# Patient Record
Sex: MALE | Race: WHITE | NOT HISPANIC OR LATINO | Employment: FULL TIME | URBAN - NONMETROPOLITAN AREA
[De-identification: names, ages, dates, MRNs, and addresses within clinical notes are randomized per-mention and may not be internally consistent; named-entity substitution may affect disease eponyms.]

---

## 2017-02-26 ENCOUNTER — HISTORY (OUTPATIENT)
Dept: EMERGENCY MEDICINE | Facility: OTHER | Age: 37
End: 2017-02-26

## 2017-08-31 ENCOUNTER — HISTORY (OUTPATIENT)
Dept: EMERGENCY MEDICINE | Facility: OTHER | Age: 37
End: 2017-08-31

## 2018-05-21 ENCOUNTER — OFFICE VISIT (OUTPATIENT)
Dept: FAMILY MEDICINE | Facility: OTHER | Age: 38
End: 2018-05-21
Attending: NURSE PRACTITIONER
Payer: COMMERCIAL

## 2018-05-21 VITALS
BODY MASS INDEX: 33.18 KG/M2 | SYSTOLIC BLOOD PRESSURE: 140 MMHG | TEMPERATURE: 98 F | WEIGHT: 245 LBS | HEART RATE: 72 BPM | DIASTOLIC BLOOD PRESSURE: 82 MMHG | HEIGHT: 72 IN
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
## 2018-05-21 DIAGNOSIS — Z91.89 AT HIGH RISK FOR TICK BORNE ILLNESS: ICD-10-CM

## 2018-05-21 LAB
BASOPHILS # BLD AUTO: 0 10E9/L (ref 0–0.2)
BASOPHILS NFR BLD AUTO: 0.6 %
DIFFERENTIAL METHOD BLD: ABNORMAL
EOSINOPHIL # BLD AUTO: 0.1 10E9/L (ref 0–0.7)
EOSINOPHIL NFR BLD AUTO: 1.2 %
ERYTHROCYTE [DISTWIDTH] IN BLOOD BY AUTOMATED COUNT: 13 % (ref 10–15)
HCT VFR BLD AUTO: 39.6 % (ref 40–53)
HGB BLD-MCNC: 13.4 G/DL (ref 13.3–17.7)
IMM GRANULOCYTES # BLD: 0 10E9/L (ref 0–0.4)
IMM GRANULOCYTES NFR BLD: 0.2 %
LYMPHOCYTES # BLD AUTO: 1.5 10E9/L (ref 0.8–5.3)
LYMPHOCYTES NFR BLD AUTO: 29.8 %
MCH RBC QN AUTO: 29.2 PG (ref 26.5–33)
MCHC RBC AUTO-ENTMCNC: 33.8 G/DL (ref 31.5–36.5)
MCV RBC AUTO: 86 FL (ref 78–100)
MONOCYTES # BLD AUTO: 0.5 10E9/L (ref 0–1.3)
MONOCYTES NFR BLD AUTO: 9.5 %
NEUTROPHILS # BLD AUTO: 2.9 10E9/L (ref 1.6–8.3)
NEUTROPHILS NFR BLD AUTO: 58.7 %
PLATELET # BLD AUTO: 277 10E9/L (ref 150–450)
RBC # BLD AUTO: 4.59 10E12/L (ref 4.4–5.9)
WBC # BLD AUTO: 4.9 10E9/L (ref 4–11)

## 2018-05-21 PROCEDURE — 85025 COMPLETE CBC W/AUTO DIFF WBC: CPT | Performed by: NURSE PRACTITIONER

## 2018-05-21 PROCEDURE — 99213 OFFICE O/P EST LOW 20 MIN: CPT | Performed by: NURSE PRACTITIONER

## 2018-05-21 PROCEDURE — 86666 EHRLICHIA ANTIBODY: CPT | Performed by: NURSE PRACTITIONER

## 2018-05-21 PROCEDURE — 36415 COLL VENOUS BLD VENIPUNCTURE: CPT | Performed by: NURSE PRACTITIONER

## 2018-05-21 RX ORDER — CLOBETASOL PROPIONATE 0.5 MG/G
OINTMENT TOPICAL
COMMUNITY
Start: 2017-04-27 | End: 2018-11-08

## 2018-05-21 RX ORDER — SERTRALINE HYDROCHLORIDE 100 MG/1
150 TABLET, FILM COATED ORAL
COMMUNITY
Start: 2016-10-26 | End: 2018-11-08

## 2018-05-21 NOTE — NURSING NOTE
Patient presents to the clinic for concerns regarding lyme disease. Patient reports picking off dozens of ticks this spring and states he feels achy x 4 days.  Geovanna MILLER CMA.......5/21/2018..10:11 AM

## 2018-05-21 NOTE — PROGRESS NOTES
Nursing Notes:   Geovanna Stacy CMA  5/21/2018 10:12 AM  Unsigned  Patient presents to the clinic for concerns regarding lyme disease. Patient reports picking off dozens of ticks this spring and states he feels achy x 4 days.  Geovanna MILLER CMA.......5/21/2018..10:11 AM      SUBJECTIVE:   Sb Olivo is a 37 year old male who presents to clinic today for the following health issues:    Lyme's Concern      Duration: Patient comes in with concerns that he might have Lyme's disease.  First bit by a deer tick 2 weeks ago.  Since has had several ticks after.  Denies fevers, chills, rashes.  States he has body aches, headaches and fatigue.    Description (location/character/radiation): None    Intensity:  mild    Accompanying signs and symptoms: See above    History (similar episodes/previous evaluation): None    Precipitating or alleviating factors: Several tick bites a few deer ticks    Therapies tried and outcome: None       Problem list and histories reviewed & adjusted, as indicated.  Additional history: as documented    Current Outpatient Prescriptions   Medication Sig Dispense Refill     clobetasol (TEMOVATE) 0.05 % ointment        sertraline (ZOLOFT) 100 MG tablet Take 150 mg by mouth       Allergies   Allergen Reactions     Amoxicillin Hives     Penicillins      Other reaction(s): *Unknown       Reviewed and updated as needed this visit by clinical staff  Tobacco  Allergies  Meds  Med Hx  Surg Hx  Fam Hx  Soc Hx      Reviewed and updated as needed this visit by Provider         ROS:  A comprehensive 10 point ROS was obtained and documented for notable findings in the HPI.       OBJECTIVE:     /82 (BP Location: Right arm, Patient Position: Sitting, Cuff Size: Adult Large)  Pulse 72  Temp 98  F (36.7  C) (Tympanic)  Ht 6' (1.829 m)  Wt 245 lb (111.1 kg)  BMI 33.23 kg/m2  Body mass index is 33.23 kg/(m^2).  GENERAL: healthy, alert and no distress  EYES: Eyes grossly normal to inspection  RESP: With  ease, non labored  SKIN: no suspicious lesions or rashes  PSYCH: mentation appears normal, affect normal/bright    Diagnostic Test Results:  Results for orders placed or performed in visit on 05/21/18 (from the past 24 hour(s))   CBC and Differential   Result Value Ref Range    WBC 4.9 4.0 - 11.0 10e9/L    RBC Count 4.59 4.4 - 5.9 10e12/L    Hemoglobin 13.4 13.3 - 17.7 g/dL    Hematocrit 39.6 (L) 40.0 - 53.0 %    MCV 86 78 - 100 fl    MCH 29.2 26.5 - 33.0 pg    MCHC 33.8 31.5 - 36.5 g/dL    RDW 13.0 10.0 - 15.0 %    Platelet Count 277 150 - 450 10e9/L    Diff Method Automated Method     % Neutrophils 58.7 %    % Lymphocytes 29.8 %    % Monocytes 9.5 %    % Eosinophils 1.2 %    % Basophils 0.6 %    % Immature Granulocytes 0.2 %    Absolute Neutrophil 2.9 1.6 - 8.3 10e9/L    Absolute Lymphocytes 1.5 0.8 - 5.3 10e9/L    Absolute Monocytes 0.5 0.0 - 1.3 10e9/L    Absolute Eosinophils 0.1 0.0 - 0.7 10e9/L    Absolute Basophils 0.0 0.0 - 0.2 10e9/L    Abs Immature Granulocytes 0.0 0 - 0.4 10e9/L       ASSESSMENT/PLAN:     1. Tick bite, initial encounter  - CBC and Differential  - HCL LYME IGG AND IGM SCREEN  - Anaplasma phagocytoph antibody IgG IgM    2. At high risk for tick borne illness  - CBC and Differential  - HCL LYME IGG AND IGM SCREEN  - Anaplasma phagocytoph antibody IgG IgM    Medical Decision Making:    Differential Diagnosis:  Fatigue, Stress, depression, Tick born illness.     Serious Comorbid Conditions:  Adult:  None    PLAN:    Rest, fluids, will call with lab results.     Followup:    If not improving or if condition worsens, follow up with your Primary Care Provider        Deirdre Nelson NP, 5/21/2018 10:16 AM

## 2018-05-21 NOTE — MR AVS SNAPSHOT
"              After Visit Summary   5/21/2018    Sb Olivo    MRN: [id number]           Patient Information     Date Of Birth          1980        Visit Information        Provider Department      5/21/2018 10:00 AM Deirdre Nelson NP [facility]        Today's Diagnoses     Tick bite, initial encounter    -  1    At high risk for tick borne illness          Care Instructions    Thank you for choosing [facility] for your care.     You are advised to contact our office if there is no improvement or if there is worsening of conditions or symptoms, either come back or follow up with your primary care provider.     You were seen in the [facility]. This is for urgent care needs. If you have other questions or concerns please see your primary care provider.         Deirdre Nelson RN, MSN, FNP  [facility]               Follow-ups after your visit        Who to contact     If you have questions or need follow up information about today's clinic visit or your schedule please contact [facility] directly at [phone].  Normal or non-critical lab and imaging results will be communicated to you by Twillionhart, letter or phone within 4 business days after the clinic has received the results. If you do not hear from us within 7 days, please contact the clinic through pSiFlow Technologyt or phone. If you have a critical or abnormal lab result, we will notify you by phone as soon as possible.  Submit refill requests through Aspire or call your pharmacy and they will forward the refill request to us. Please allow 3 business days for your refill to be completed.          Additional Information About Your Visit        Tw[employer] Information     Aspire lets you send messages to your doctor, view your test results, renew your prescriptions, schedule appointments and more. To sign up, go to [email or web address] . Click on \"Log in\" on " "the left side of the screen, which will take you to the Welcome page. Then click on \"Sign up Now\" on the right side of the page.     You will be asked to enter the access code listed below, as well as some personal information. Please follow the directions to create your username and password.     Your access code is: [email or web address]  Expires: 2018 10:41 AM     Your access code will  in 90 days. If you need help or a new code, please call your [facility] or [phone].        Care EveryWhere ID     This is your Care EveryWhere ID. This could be used by other organizations to access your [place] medical records  [id number]        Your Vitals Were     Pulse Temperature Height BMI (Body Mass Index)          72 98  F (36.7  C) (Tympanic) 6' (1.829 m) 33.23 kg/m2         Blood Pressure from Last 3 Encounters:   18 140/82    Weight from Last 3 Encounters:   18 245 lb (111.1 kg)              We Performed the Following     Anaplasma phagocytoph antibody IgG IgM     CBC and Differential     HCL LYME IGG AND IGM SCREEN        Primary Care Provider Fax #    Physician No Ref-Primary [phone]       No address on file        Equal Access to Services     [facility] : Hadyanely garciao Soletty, waaxda luqadaha, qaybta kaalmada adeegyada, kassy coronado . So [facility] [phone].    ATENCIÓN: Si habla español, tiene a vincent disposición servicios gratuitos de asistencia lingüística. Llame al [phone].    We comply with applicable federal civil rights laws and [place] laws. We do not discriminate on the basis of race, color, national origin, age, disability, sex, sexual orientation, or gender identity.            Thank you!     Thank you for choosing [facility] [place]  for your care. Our goal is always to provide you with excellent care. Hearing back from our patients is one way we can continue to improve our services. Please take a few minutes to complete " the written survey that you may receive in the mail after your visit with us. Thank you!             Your Updated Medication List - Protect others around you: Learn how to safely use, store and throw away your medicines at www.disposemymeds.org.          This list is accurate as of 5/21/18 10:41 AM.  Always use your most recent med list.                   Brand Name Dispense Instructions for use Diagnosis    clobetasol 0.05 % ointment    TEMOVATE          sertraline 100 MG tablet    ZOLOFT     Take 150 mg by mouth

## 2018-05-21 NOTE — PATIENT INSTRUCTIONS
Thank you for choosing [facility] for your care.     You are advised to contact our office if there is no improvement or if there is worsening of conditions or symptoms, either come back or follow up with your primary care provider.     You were seen in the [facility]. This is for urgent care needs. If you have other questions or concerns please see your primary care provider.         Deirdre Nelson RN, MSN, FNP  [facility]

## 2018-05-23 LAB
A PHAGOCYTOPH IGG TITR SER IF: NORMAL {TITER}
A PHAGOCYTOPH IGM TITR SER IF: NORMAL {TITER}

## 2018-11-08 ENCOUNTER — OFFICE VISIT (OUTPATIENT)
Dept: FAMILY MEDICINE | Facility: OTHER | Age: 38
End: 2018-11-08
Attending: PHYSICIAN ASSISTANT
Payer: COMMERCIAL

## 2018-11-08 VITALS
TEMPERATURE: 98.8 F | OXYGEN SATURATION: 95 % | DIASTOLIC BLOOD PRESSURE: 82 MMHG | BODY MASS INDEX: 35.21 KG/M2 | HEIGHT: 72 IN | SYSTOLIC BLOOD PRESSURE: 132 MMHG | WEIGHT: 260 LBS | HEART RATE: 81 BPM

## 2018-11-08 DIAGNOSIS — J02.9 SORE THROAT: Primary | ICD-10-CM

## 2018-11-08 LAB
DEPRECATED S PYO AG THROAT QL EIA: NORMAL
SPECIMEN SOURCE: NORMAL

## 2018-11-08 PROCEDURE — 87880 STREP A ASSAY W/OPTIC: CPT | Performed by: NURSE PRACTITIONER

## 2018-11-08 PROCEDURE — 99213 OFFICE O/P EST LOW 20 MIN: CPT | Performed by: NURSE PRACTITIONER

## 2018-11-08 RX ORDER — VENLAFAXINE HYDROCHLORIDE 150 MG/1
75 CAPSULE, EXTENDED RELEASE ORAL DAILY
COMMUNITY
Start: 2018-07-26 | End: 2020-03-03

## 2018-11-08 ASSESSMENT — ENCOUNTER SYMPTOMS: SORE THROAT: 1

## 2018-11-08 ASSESSMENT — PAIN SCALES - GENERAL: PAINLEVEL: MILD PAIN (2)

## 2018-11-08 NOTE — NURSING NOTE
Patient presents to the clinic today with complaints of waking up with a sore throat this morning.  Sherin Prater LPN 11/8/2018   1:51 PM

## 2018-11-08 NOTE — PROGRESS NOTES
SUBJECTIVE:   Sb Olivo is a 37 year old male who presents to clinic today for the following health issues:    Presents with 2 children for strep test, have had sore throats--nasal congestion, no fever  Taking fluids well  No other symptoms    HPI    There is no problem list on file for this patient.    History reviewed. No pertinent surgical history.    Social History   Substance Use Topics     Smoking status: Never Smoker     Smokeless tobacco: Current User     Types: Chew     Alcohol use Yes      Comment: Alcoholic Drinks/day: socially     History reviewed. No pertinent family history.      Current Outpatient Prescriptions   Medication Sig Dispense Refill     metFORMIN (GLUCOPHAGE) 500 MG tablet Take 500 mg by mouth 2 times daily (with meals)  2     venlafaxine (EFFEXOR-XR) 150 MG 24 hr capsule Take 150 mg by mouth daily       Allergies   Allergen Reactions     Amoxicillin Hives     Penicillins      Other reaction(s): *Unknown     Recent Labs   Lab Test  08/31/17   1819   CR  1.04   GFRESTBLACK  >60   POTASSIUM  3.8      BP Readings from Last 3 Encounters:   11/08/18 132/82   05/21/18 140/82    Wt Readings from Last 3 Encounters:   11/08/18 260 lb (117.9 kg)   05/21/18 245 lb (111.1 kg)                  Labs reviewed in EPIC    Review of Systems   HENT: Positive for sore throat.    All other systems reviewed and are negative.       OBJECTIVE:     /82 (BP Location: Right arm, Patient Position: Sitting, Cuff Size: Adult Regular)  Pulse 81  Temp 98.8  F (37.1  C) (Tympanic)  Ht 6' (1.829 m)  Wt 260 lb (117.9 kg)  SpO2 95%  BMI 35.26 kg/m2  Body mass index is 35.26 kg/(m^2).  Physical Exam   Constitutional: He is oriented to person, place, and time. He appears well-developed and well-nourished.   HENT:   Head: Normocephalic and atraumatic.   Posterior pharynx mildly injected, no tonsillar hypertrophy or pustules, uvula midline without edema   Neck: Normal range of motion. Neck supple.    Cardiovascular: Normal rate.    Pulmonary/Chest: Effort normal.   Musculoskeletal: Normal range of motion.   Lymphadenopathy:     He has no cervical adenopathy.   Neurological: He is alert and oriented to person, place, and time.   Skin: Skin is warm and dry.   Psychiatric: He has a normal mood and affect. His behavior is normal. Judgment and thought content normal.   Nursing note and vitals reviewed.      Results for orders placed or performed in visit on 11/08/18   Strep, Rapid Screen   Result Value Ref Range    Specimen Description Throat     Rapid Strep A Screen       Negative presumptive for Group A Beta Streptococcus       ASSESSMENT/PLAN:   Strep negative likely viral  Recommend pushing fluids, ibuprofen, supportive therapies  Return to clinic if no improvement    1. Sore throat    - Strep, Rapid Screen      ALEX León CNP  [facility] [place]

## 2018-11-08 NOTE — PATIENT INSTRUCTIONS
Self-Care for Sore Throats    Sore throats happen for many reasons, such as colds, allergies, and infections caused by viruses or bacteria. In any case, your throat becomes red and sore. Your goal for self-care is to reduce your discomfort while giving your throat a chance to heal.  Moisten and soothe your throat  Tips include the following:    Try a sip of water first thing after waking up.    Keep your throat moist by drinking 6 or more glasses of clear liquids every day.    Run a cool-air humidifier in your room overnight.    Avoid cigarette smoke.     Suck on throat lozenges, cough drops, hard candy, ice chips, or frozen fruit-juice bars. Use the sugar-free versions if your diet or medical condition requires them.  Gargle to ease irritation  Gargling every hour or 2 can ease irritation. Try gargling with 1 of these solutions:    1/4 teaspoon of salt in 1/2 cup of warm water    An over-the-counter anesthetic gargle  Use medicine for more relief  Over-the-counter medicine can reduce sore throat symptoms. Ask your pharmacist if you have questions about which medicine to use:    Ease pain with anesthetic sprays. Aspirin or an aspirin substitute also helps. Remember, never give aspirin to anyone 18 or younger, or if you are already taking blood thinners.     For sore throats caused by allergies, try antihistamines to block the allergic reaction.    Remember: unless a sore throat is caused by a bacterial infection, antibiotics won t help you.  Prevent future sore throats  Prevention tips include the following:    Stop smoking or reduce contact with secondhand smoke. Smoke irritates the tender throat lining.    Limit contact with pets and with allergy-causing substances, such as pollen and mold.    When you re around someone with a sore throat or cold, wash your hands often to keep viruses or bacteria from spreading.    Don t strain your vocal cords.  Call your healthcare provider  Contact your healthcare provider if  you have:    A temperature over 101 F (38.3 C)    White spots on the throat    Great difficulty swallowing    Trouble breathing    A skin rash    Recent exposure to someone else with strep bacteria    Severe hoarseness and swollen glands in the neck or jaw   Date Last Reviewed: 8/1/2016 2000-2018 [employer]. [address] [place] [address]. All rights reserved. This information is not intended as a substitute for professional medical care. Always follow your healthcare professional's instructions.

## 2018-11-08 NOTE — MR AVS SNAPSHOT
After Visit Summary   11/8/2018    Sb Olivo    MRN: [id number]           Patient Information     Date Of Birth          1980        Visit Information        Provider Department      11/8/2018 1:15 PM Allyson Frgaa APRN CNP [facility]        Today's Diagnoses     Sore throat    -  1      Care Instructions      Self-Care for Sore Throats    Sore throats happen for many reasons, such as colds, allergies, and infections caused by viruses or bacteria. In any case, your throat becomes red and sore. Your goal for self-care is to reduce your discomfort while giving your throat a chance to heal.  Moisten and soothe your throat  Tips include the following:    Try a sip of water first thing after waking up.    Keep your throat moist by drinking 6 or more glasses of clear liquids every day.    Run a cool-air humidifier in your room overnight.    Avoid cigarette smoke.     Suck on throat lozenges, cough drops, hard candy, ice chips, or frozen fruit-juice bars. Use the sugar-free versions if your diet or medical condition requires them.  Gargle to ease irritation  Gargling every hour or 2 can ease irritation. Try gargling with 1 of these solutions:    1/4 teaspoon of salt in 1/2 cup of warm water    An over-the-counter anesthetic gargle  Use medicine for more relief  Over-the-counter medicine can reduce sore throat symptoms. Ask your pharmacist if you have questions about which medicine to use:    Ease pain with anesthetic sprays. Aspirin or an aspirin substitute also helps. Remember, never give aspirin to anyone 18 or younger, or if you are already taking blood thinners.     For sore throats caused by allergies, try antihistamines to block the allergic reaction.    Remember: unless a sore throat is caused by a bacterial infection, antibiotics won t help you.  Prevent future sore throats  Prevention tips include the following:    Stop smoking or reduce contact with secondhand  smoke. Smoke irritates the tender throat lining.    Limit contact with pets and with allergy-causing substances, such as pollen and mold.    When you re around someone with a sore throat or cold, wash your hands often to keep viruses or bacteria from spreading.    Don t strain your vocal cords.  Call your healthcare provider  Contact your healthcare provider if you have:    A temperature over 101 F (38.3 C)    White spots on the throat    Great difficulty swallowing    Trouble breathing    A skin rash    Recent exposure to someone else with strep bacteria    Severe hoarseness and swollen glands in the neck or jaw   Date Last Reviewed: 8/1/2016 2000-2018 [employer]. [address] [place] [address]. All rights reserved. This information is not intended as a substitute for professional medical care. Always follow your healthcare professional's instructions.                Follow-ups after your visit        Follow-up notes from your care team     Return if symptoms worsen or fail to improve.      Who to contact     If you have questions or need follow up information about today's clinic visit or your schedule please contact [facility] directly at [phone].  Normal or non-critical lab and imaging results will be communicated to you by Biotie Therapieshart, letter or phone within 4 business days after the clinic has received the results. If you do not hear from us within 7 days, please contact the clinic through BrainCellst or phone. If you have a critical or abnormal lab result, we will notify you by phone as soon as possible.  Submit refill requests through Ooploo or call your pharmacy and they will forward the refill request to us. Please allow 3 business days for your refill to be completed.          Additional Information About Your Visit        Biotie The[employer]ies[employer]Share Information     Ooploo lets you send messages to your doctor, view your test results, renew your prescriptions, schedule  "appointments and more. To sign up, go to [email or web address] . Click on \"Log in\" on the left side of the screen, which will take you to the Welcome page. Then click on \"Sign up Now\" on the right side of the page.     You will be asked to enter the access code listed below, as well as some personal information. Please follow the directions to create your username and password.     Your access code is: [email or web address]  Expires: 2019  2:33 PM     Your access code will  in 90 days. If you need help or a new code, please call your [place] clinic or [phone].        Care EveryWhere ID     This is your Care EveryWhere ID. This could be used by other organizations to access your [place] medical records  [id number]        Your Vitals Were     Pulse Temperature Height Pulse Oximetry BMI (Body Mass Index)       81 98.8  F (37.1  C) (Tympanic) 6' (1.829 m) 95% 35.26 kg/m2        Blood Pressure from Last 3 Encounters:   18 132/82   18 140/82    Weight from Last 3 Encounters:   18 260 lb (117.9 kg)   18 245 lb (111.1 kg)              We Performed the Following     Strep, Rapid Screen        Primary Care Provider Fax #    Physician No Ref-Primary [phone]       No address on file        Equal Access to Services     [facility]: Hadii nayan Santos, waaxda luqadaha, qaybta kaalmada denisa, kassy coronado . So [facility] [phone].    ATENCIÓN: Si habla español, tiene a vincent disposición servicios gratuitos de asistencia lingüística. Llame al [phone].    We comply with applicable federal civil rights laws and [place] laws. We do not discriminate on the basis of race, color, national origin, age, disability, sex, sexual orientation, or gender identity.            Thank you!     Thank you for choosing [facility] [place]  for your care. Our goal is always to provide you with excellent care. Hearing back from our patients is one way we " can continue to improve our services. Please take a few minutes to complete the written survey that you may receive in the mail after your visit with us. Thank you!             Your Updated Medication List - Protect others around you: Learn how to safely use, store and throw away your medicines at www.disposemymeds.org.          This list is accurate as of 11/8/18  2:33 PM.  Always use your most recent med list.                   Brand Name Dispense Instructions for use Diagnosis    metFORMIN 500 MG tablet    GLUCOPHAGE     Take 500 mg by mouth 2 times daily (with meals)        venlafaxine 150 MG 24 hr capsule    EFFEXOR-XR     Take 150 mg by mouth daily

## 2019-07-01 ENCOUNTER — HOSPITAL ENCOUNTER (EMERGENCY)
Facility: OTHER | Age: 39
Discharge: HOME | End: 2019-07-01
Attending: FAMILY MEDICINE | Admitting: FAMILY MEDICINE
Payer: COMMERCIAL

## 2019-07-01 VITALS
BODY MASS INDEX: 35.21 KG/M2 | DIASTOLIC BLOOD PRESSURE: 103 MMHG | HEART RATE: 70 BPM | HEIGHT: 72 IN | SYSTOLIC BLOOD PRESSURE: 176 MMHG | RESPIRATION RATE: 38 BRPM | WEIGHT: 260 LBS | TEMPERATURE: 97.9 F | OXYGEN SATURATION: 94 %

## 2019-07-01 DIAGNOSIS — I10 BENIGN ESSENTIAL HYPERTENSION: ICD-10-CM

## 2019-07-01 LAB
ANION GAP SERPL CALCULATED.3IONS-SCNC: 9 MMOL/L (ref 3–14)
BASOPHILS # BLD AUTO: 0.1 10E9/L (ref 0–0.2)
BASOPHILS NFR BLD AUTO: 0.6 %
BUN SERPL-MCNC: 11 MG/DL (ref 7–25)
CALCIUM SERPL-MCNC: 9.4 MG/DL (ref 8.6–10.3)
CHLORIDE SERPL-SCNC: 101 MMOL/L (ref 98–107)
CO2 SERPL-SCNC: 25 MMOL/L (ref 21–31)
CREAT SERPL-MCNC: 1 MG/DL (ref 0.7–1.3)
DIFFERENTIAL METHOD BLD: NORMAL
EOSINOPHIL # BLD AUTO: 0.2 10E9/L (ref 0–0.7)
EOSINOPHIL NFR BLD AUTO: 2 %
ERYTHROCYTE [DISTWIDTH] IN BLOOD BY AUTOMATED COUNT: 12.8 % (ref 10–15)
GFR SERPL CREATININE-BSD FRML MDRD: 83 ML/MIN/{1.73_M2}
GLUCOSE SERPL-MCNC: 130 MG/DL (ref 70–105)
HCT VFR BLD AUTO: 42.6 % (ref 40–53)
HGB BLD-MCNC: 13.9 G/DL (ref 13.3–17.7)
IMM GRANULOCYTES # BLD: 0 10E9/L (ref 0–0.4)
IMM GRANULOCYTES NFR BLD: 0.4 %
LYMPHOCYTES # BLD AUTO: 2.5 10E9/L (ref 0.8–5.3)
LYMPHOCYTES NFR BLD AUTO: 29.5 %
MCH RBC QN AUTO: 29.3 PG (ref 26.5–33)
MCHC RBC AUTO-ENTMCNC: 32.6 G/DL (ref 31.5–36.5)
MCV RBC AUTO: 90 FL (ref 78–100)
MONOCYTES # BLD AUTO: 0.5 10E9/L (ref 0–1.3)
MONOCYTES NFR BLD AUTO: 6.4 %
NEUTROPHILS # BLD AUTO: 5.2 10E9/L (ref 1.6–8.3)
NEUTROPHILS NFR BLD AUTO: 61.1 %
PLATELET # BLD AUTO: 298 10E9/L (ref 150–450)
POTASSIUM SERPL-SCNC: 3.4 MMOL/L (ref 3.5–5.1)
RBC # BLD AUTO: 4.74 10E12/L (ref 4.4–5.9)
SODIUM SERPL-SCNC: 135 MMOL/L (ref 134–144)
WBC # BLD AUTO: 8.5 10E9/L (ref 4–11)

## 2019-07-01 PROCEDURE — 85025 COMPLETE CBC W/AUTO DIFF WBC: CPT | Performed by: FAMILY MEDICINE

## 2019-07-01 PROCEDURE — 36415 COLL VENOUS BLD VENIPUNCTURE: CPT | Performed by: FAMILY MEDICINE

## 2019-07-01 PROCEDURE — 93005 ELECTROCARDIOGRAM TRACING: CPT | Performed by: FAMILY MEDICINE

## 2019-07-01 PROCEDURE — 99283 EMERGENCY DEPT VISIT LOW MDM: CPT | Mod: Z6 | Performed by: FAMILY MEDICINE

## 2019-07-01 PROCEDURE — 96361 HYDRATE IV INFUSION ADD-ON: CPT | Mod: XU | Performed by: FAMILY MEDICINE

## 2019-07-01 PROCEDURE — 25000131 ZZH RX MED GY IP 250 OP 636 PS 637: Performed by: FAMILY MEDICINE

## 2019-07-01 PROCEDURE — 96374 THER/PROPH/DIAG INJ IV PUSH: CPT | Mod: XU | Performed by: FAMILY MEDICINE

## 2019-07-01 PROCEDURE — 99284 EMERGENCY DEPT VISIT MOD MDM: CPT | Mod: 25 | Performed by: FAMILY MEDICINE

## 2019-07-01 PROCEDURE — 93010 ELECTROCARDIOGRAM REPORT: CPT | Performed by: INTERNAL MEDICINE

## 2019-07-01 PROCEDURE — 80048 BASIC METABOLIC PNL TOTAL CA: CPT | Performed by: FAMILY MEDICINE

## 2019-07-01 PROCEDURE — 25000128 H RX IP 250 OP 636: Performed by: FAMILY MEDICINE

## 2019-07-01 RX ORDER — HYDROCHLOROTHIAZIDE 12.5 MG/1
25 TABLET ORAL DAILY
Qty: 15 TABLET | Refills: 0 | Status: SHIPPED | OUTPATIENT
Start: 2019-07-01 | End: 2019-07-11

## 2019-07-01 RX ORDER — ONDANSETRON 4 MG/1
4 TABLET, ORALLY DISINTEGRATING ORAL
Status: COMPLETED | OUTPATIENT
Start: 2019-07-01 | End: 2019-07-01

## 2019-07-01 RX ORDER — ONDANSETRON 2 MG/ML
4 INJECTION INTRAMUSCULAR; INTRAVENOUS EVERY 30 MIN PRN
Status: DISCONTINUED | OUTPATIENT
Start: 2019-07-01 | End: 2019-07-02 | Stop reason: HOSPADM

## 2019-07-01 RX ORDER — SODIUM CHLORIDE 9 MG/ML
1000 INJECTION, SOLUTION INTRAVENOUS CONTINUOUS
Status: DISCONTINUED | OUTPATIENT
Start: 2019-07-01 | End: 2019-07-02 | Stop reason: HOSPADM

## 2019-07-01 RX ADMIN — SODIUM CHLORIDE 1000 ML: 9 INJECTION, SOLUTION INTRAVENOUS at 21:23

## 2019-07-01 RX ADMIN — ONDANSETRON 4 MG: 4 TABLET, ORALLY DISINTEGRATING ORAL at 20:25

## 2019-07-01 RX ADMIN — ONDANSETRON 4 MG: 2 INJECTION INTRAMUSCULAR; INTRAVENOUS at 21:24

## 2019-07-01 ASSESSMENT — MIFFLIN-ST. JEOR: SCORE: 2127.35

## 2019-07-01 NOTE — ED AVS SNAPSHOT
[facility]  [address]  [place] [address]  Phone:  [phone]  Fax:  [phone]                                    bS Olivo   MRN: [id number]    Department:  [facility]   Date of Visit:  7/1/2019           After Visit Summary Signature Page    I have received my discharge instructions, and my questions have been answered. I have discussed any challenges I see with this plan with the nurse or doctor.    ..........................................................................................................................................  Patient/Patient Representative Signature      ..........................................................................................................................................  Patient Representative Print Name and Relationship to Patient    ..................................................               ................................................  Date                                   Time    ..........................................................................................................................................  Reviewed by Signature/Title    ...................................................              ..............................................  Date                                               Time          22EPIC Rev 08/18

## 2019-07-02 ASSESSMENT — ENCOUNTER SYMPTOMS
ABDOMINAL PAIN: 0
SHORTNESS OF BREATH: 0
FEVER: 0

## 2019-07-02 NOTE — ED TRIAGE NOTES
Patient to ER reporting lightheadedness today, he reports nausea and vomiting today as well.   He denies working outside in the heat, but states he did yesterday.   VSS.

## 2019-07-02 NOTE — ED PROVIDER NOTES
History     Chief Complaint   Patient presents with     Dizziness     reports lightheadedness that exacerbates with activity. started today      Nausea     HPI  Sb Olivo is a 38 year old male who presents the emergency department with lightheadedness and some nausea today.  One episode of vomiting today.  Outside a lot lately history of dehydration and similar symptoms.  History of type 2 diabetes on metformin, does not see the doctor regularly.  Does not believe he has high blood pressure.  No chest pain or shortness of breath.  No abdominal pain.  No increased swelling in his lower extremities, no recent fevers chills or shakes, no diarrhea.  No room spinning dizziness or illusion of motion.  Reviewed nurse's notes below, similar history is related to me.  Patient to ER reporting lightheadedness today, he reports nausea and vomiting today as well.   He denies working outside in the heat, but states he did yesterday.   Allergies:  Allergies   Allergen Reactions     Amoxicillin Hives     Apremilast Other (See Comments)     depression     Penicillins      Other reaction(s): *Unknown       Problem List:    There are no active problems to display for this patient.       Past Medical History:    History reviewed. No pertinent past medical history.    Past Surgical History:    History reviewed. No pertinent surgical history.    Family History:    No family history on file.    Social History:  Marital Status:   [2]  Social History     Tobacco Use     Smoking status: Never Smoker     Smokeless tobacco: Current User     Types: Chew   Substance Use Topics     Alcohol use: Yes     Comment: Alcoholic Drinks/day: socially     Drug use: No        Medications:      metFORMIN (GLUCOPHAGE) 500 MG tablet   ustekinumab (STELARA) 90 MG/ML   venlafaxine (EFFEXOR-XR) 150 MG 24 hr capsule         Review of Systems   Constitutional: Negative for fever.   Respiratory: Negative for shortness of breath.    Cardiovascular:  Negative for chest pain.   Gastrointestinal: Negative for abdominal pain.   All other systems reviewed and are negative.      Physical Exam   BP: (!) 160/101  Heart Rate: 79  Temp: 97.9  F (36.6  C)  Resp: 18  Height: 182.9 cm (6')  Weight: 117.9 kg (260 lb)  SpO2: 95 %      Physical Exam   Constitutional: No distress.   HENT:   Head: Atraumatic.   Mouth/Throat: Oropharynx is clear and moist.   Eyes: Pupils are equal, round, and reactive to light. No scleral icterus.   Cardiovascular: Normal heart sounds and intact distal pulses.   Pulmonary/Chest: Breath sounds normal. No respiratory distress.   Abdominal: Soft. Bowel sounds are normal. There is no tenderness.   Musculoskeletal: He exhibits no edema or tenderness.   Skin: Skin is warm. No rash noted. He is not diaphoretic.   Nursing note and vitals reviewed.      ED Course        Procedures  EKG: Normal sinus rhythm normal EKG rate 72        Results for orders placed or performed during the hospital encounter of 07/01/19 (from the past 24 hour(s))   CBC with platelets differential   Result Value Ref Range    WBC 8.5 4.0 - 11.0 10e9/L    RBC Count 4.74 4.4 - 5.9 10e12/L    Hemoglobin 13.9 13.3 - 17.7 g/dL    Hematocrit 42.6 40.0 - 53.0 %    MCV 90 78 - 100 fl    MCH 29.3 26.5 - 33.0 pg    MCHC 32.6 31.5 - 36.5 g/dL    RDW 12.8 10.0 - 15.0 %    Platelet Count 298 150 - 450 10e9/L    Diff Method Automated Method     % Neutrophils 61.1 %    % Lymphocytes 29.5 %    % Monocytes 6.4 %    % Eosinophils 2.0 %    % Basophils 0.6 %    % Immature Granulocytes 0.4 %    Absolute Neutrophil 5.2 1.6 - 8.3 10e9/L    Absolute Lymphocytes 2.5 0.8 - 5.3 10e9/L    Absolute Monocytes 0.5 0.0 - 1.3 10e9/L    Absolute Eosinophils 0.2 0.0 - 0.7 10e9/L    Absolute Basophils 0.1 0.0 - 0.2 10e9/L    Abs Immature Granulocytes 0.0 0 - 0.4 10e9/L   Basic metabolic panel   Result Value Ref Range    Sodium 135 134 - 144 mmol/L    Potassium 3.4 (L) 3.5 - 5.1 mmol/L    Chloride 101 98 - 107 mmol/L     Carbon Dioxide 25 21 - 31 mmol/L    Anion Gap 9 3 - 14 mmol/L    Glucose 130 (H) 70 - 105 mg/dL    Urea Nitrogen 11 7 - 25 mg/dL    Creatinine 1.00 0.70 - 1.30 mg/dL    GFR Estimate 83 >60 mL/min/[1.73_m2]    GFR Estimate If Black >90 >60 mL/min/[1.73_m2]    Calcium 9.4 8.6 - 10.3 mg/dL       Medications   0.9% sodium chloride BOLUS (1,000 mLs Intravenous New Bag 7/1/19 2123)     Followed by   sodium chloride 0.9% infusion (has no administration in time range)   ondansetron (ZOFRAN) injection 4 mg (4 mg Intravenous Given 7/1/19 2124)   ondansetron (ZOFRAN-ODT) ODT tab 4 mg (4 mg Oral Given 7/1/19 2025)       Assessments & Plan (with Medical Decision Making)   Symptoms likely multifactorial including dehydration and elevated blood pressure.  His blood glucose was only slightly elevated.  Reviewing his blood pressures and seen his blood pressures today in the emergency department I am concerned he is hypertensive.  The lightheadedness today may have been feeling his hypertension but I suspect some degree of dehydration and heat exhaustion as well.  He is feeling better after IV fluids here in the emergency department.  We will start him on hydrochlorothiazide and recommend close follow-up with primary care for healthcare maintenance exam.  Return to the emergency department shortness of breath or chest pain, illusion of motion or worsening of symptoms.  Patient verbalized understanding plan is in agreement he left the ER in improved condition.       Medication List      There are no discharge medications for this visit.         Final diagnoses:   Benign essential hypertension       7/1/2019   [facility]     Baudilio Nash MD  07/02/19 2067

## 2019-07-11 ENCOUNTER — OFFICE VISIT (OUTPATIENT)
Dept: FAMILY MEDICINE | Facility: OTHER | Age: 39
End: 2019-07-11
Attending: FAMILY MEDICINE
Payer: COMMERCIAL

## 2019-07-11 VITALS
HEIGHT: 71 IN | BODY MASS INDEX: 37.1 KG/M2 | OXYGEN SATURATION: 97 % | SYSTOLIC BLOOD PRESSURE: 138 MMHG | TEMPERATURE: 98.6 F | DIASTOLIC BLOOD PRESSURE: 72 MMHG | RESPIRATION RATE: 14 BRPM | HEART RATE: 78 BPM | WEIGHT: 265 LBS

## 2019-07-11 DIAGNOSIS — L40.50 PSORIATIC ARTHRITIS (H): ICD-10-CM

## 2019-07-11 DIAGNOSIS — K75.81 NASH (NONALCOHOLIC STEATOHEPATITIS): ICD-10-CM

## 2019-07-11 DIAGNOSIS — E11.9 CONTROLLED TYPE 2 DIABETES MELLITUS WITHOUT COMPLICATION, WITHOUT LONG-TERM CURRENT USE OF INSULIN (H): ICD-10-CM

## 2019-07-11 DIAGNOSIS — I10 BENIGN ESSENTIAL HTN: Primary | ICD-10-CM

## 2019-07-11 PROCEDURE — 99214 OFFICE O/P EST MOD 30 MIN: CPT | Performed by: FAMILY MEDICINE

## 2019-07-11 RX ORDER — LISINOPRIL 20 MG/1
20 TABLET ORAL DAILY
Qty: 90 TABLET | Refills: 3 | Status: SHIPPED | OUTPATIENT
Start: 2019-07-11 | End: 2020-09-24

## 2019-07-11 ASSESSMENT — ANXIETY QUESTIONNAIRES
IF YOU CHECKED OFF ANY PROBLEMS ON THIS QUESTIONNAIRE, HOW DIFFICULT HAVE THESE PROBLEMS MADE IT FOR YOU TO DO YOUR WORK, TAKE CARE OF THINGS AT HOME, OR GET ALONG WITH OTHER PEOPLE: NOT DIFFICULT AT ALL
6. BECOMING EASILY ANNOYED OR IRRITABLE: NOT AT ALL
1. FEELING NERVOUS, ANXIOUS, OR ON EDGE: NOT AT ALL
2. NOT BEING ABLE TO STOP OR CONTROL WORRYING: NOT AT ALL
3. WORRYING TOO MUCH ABOUT DIFFERENT THINGS: NOT AT ALL
5. BEING SO RESTLESS THAT IT IS HARD TO SIT STILL: NOT AT ALL
7. FEELING AFRAID AS IF SOMETHING AWFUL MIGHT HAPPEN: NOT AT ALL
GAD7 TOTAL SCORE: 0

## 2019-07-11 ASSESSMENT — PAIN SCALES - GENERAL: PAINLEVEL: NO PAIN (0)

## 2019-07-11 ASSESSMENT — ENCOUNTER SYMPTOMS
UNEXPECTED WEIGHT CHANGE: 0
SHORTNESS OF BREATH: 0
HEADACHES: 1

## 2019-07-11 ASSESSMENT — PATIENT HEALTH QUESTIONNAIRE - PHQ9: 5. POOR APPETITE OR OVEREATING: NOT AT ALL

## 2019-07-11 ASSESSMENT — MIFFLIN-ST. JEOR: SCORE: 2126.22

## 2019-07-11 NOTE — PROGRESS NOTES
SUBJECTIVE:   Sb Olivo is a 38 year old male who presents to clinic today for the following health issues:    HPI  Emergency department follow up for hypertension.  Was seen here on 7/1 with dizziness. Has had this in the past, in the .  Hydration worked then so he started drinking more water.  Dizziness progressed, and some nausea and christiano emesis eventually. In emergency department the work up shoes only hypertension at 160/101.  Has had mild elevated readings in the past, even as a child.  Was given low dose hydrochlorothiazide.  At home he has a cuff and getting 130-140/70-80. Has just a mild headache with all of this.    He takes metformin for type 2 diabetes.  Says last A1c was under 6 at [facility] 3 months ago.  Last reading in Care Everywhere was 8/1/18, and was 6.2.    Has known fatty liver disease, felt to be from his Humira in the past as well as obesity.  Reports minimal EtOH.    Patient Active Problem List    Diagnosis Date Noted     Benign essential HTN 07/11/2019     Priority: Medium     Psoriatic arthritis (H) 07/11/2019     Priority: Medium     Controlled type 2 diabetes mellitus without complication, without long-term current use of insulin (H) 07/11/2019     Priority: Medium     CALDERON (nonalcoholic steatohepatitis) 07/11/2019     Priority: Medium     Past Surgical History:   Procedure Laterality Date     LIVER BIOPSY  2017     Social History     Tobacco Use     Smoking status: Never Smoker     Smokeless tobacco: Current User     Types: Chew   Substance Use Topics     Alcohol use: Yes     Comment: Alcoholic Drinks/day: socially     Current Outpatient Medications   Medication Sig Dispense Refill     lisinopril (PRINIVIL/ZESTRIL) 20 MG tablet Take 1 tablet (20 mg) by mouth daily 90 tablet 3     metFORMIN (GLUCOPHAGE) 500 MG tablet Take 500 mg by mouth 2 times daily (with meals)  2     ustekinumab (STELARA) 90 MG/ML Inject 90 mg Subcutaneous every 3 months       venlafaxine (EFFEXOR-XR)  "150 MG 24 hr capsule Take 150 mg by mouth daily       Allergies   Allergen Reactions     Amoxicillin Hives     Apremilast Other (See Comments)     depression     Penicillins      Other reaction(s): *Unknown       Review of Systems   Constitutional: Negative for unexpected weight change.   Eyes: Negative for visual disturbance.   Respiratory: Negative for shortness of breath.    Cardiovascular: Negative for chest pain.   Neurological: Positive for headaches.        OBJECTIVE:     /72 (BP Location: Right arm, Patient Position: Sitting, Cuff Size: Adult Large)   Pulse 78   Temp 98.6  F (37  C) (Temporal)   Resp 14   Ht 1.791 m (5' 10.5\")   Wt 120.2 kg (265 lb)   SpO2 97%   BMI 37.49 kg/m    Body mass index is 37.49 kg/m .  Physical Exam   Constitutional: He is oriented to person, place, and time. He appears well-developed and well-nourished. No distress.   Cardiovascular: Normal rate, regular rhythm and normal heart sounds. Exam reveals no friction rub.   No murmur heard.  Pulmonary/Chest: Effort normal and breath sounds normal. No stridor. No respiratory distress. He has no wheezes. He has no rales.   Abdominal: Soft. He exhibits no distension and no mass. There is no tenderness. There is no rebound and no guarding.   Neurological: He is alert and oriented to person, place, and time.   Skin: Skin is warm and dry. He is not diaphoretic.           ASSESSMENT/PLAN:       (I10) Benign essential HTN  (primary encounter diagnosis)  Comment: emergency department notes reviewed.  Discussed with him lifestyle changes, like weight loss, to help.  Given diabetes, will stop the hydrochlorothiazide and start ACE-I with follow up in 4-6 weeks.  Plan: lisinopril (PRINIVIL/ZESTRIL) 20 MG tablet             (L40.50) Psoriatic arthritis (H)  Comment: stable  Plan:      (E11.9) Controlled type 2 diabetes mellitus without complication, without long-term current use of insulin (H)  Comment: followed at [facility]  Plan: I " offered follow up labs on this. He declines.    (K75.81) CALDERON (nonalcoholic steatohepatitis)  Comment: appears to be related to obesity or med side effects   Plan: again discussed with him weight loss, but he still has some denial about the obesity diagnosis itself.        Yuriy Zarate MD  [facility]

## 2019-07-11 NOTE — NURSING NOTE
"coming in for a ER visit for hypertension    Chief Complaint   Patient presents with     RECHECK     ER, hypertension       Initial /72 (BP Location: Right arm, Patient Position: Sitting, Cuff Size: Adult Large)   Pulse 78   Temp 98.6  F (37  C) (Temporal)   Resp 14   Ht 1.791 m (5' 10.5\")   Wt 120.2 kg (265 lb)   SpO2 97%   BMI 37.49 kg/m   Estimated body mass index is 37.49 kg/m  as calculated from the following:    Height as of this encounter: 1.791 m (5' 10.5\").    Weight as of this encounter: 120.2 kg (265 lb).  Medication Reconciliation: complete    Raquel Perez LPN  "

## 2019-07-12 ASSESSMENT — ANXIETY QUESTIONNAIRES: GAD7 TOTAL SCORE: 0

## 2019-08-04 ENCOUNTER — HOSPITAL ENCOUNTER (EMERGENCY)
Facility: OTHER | Age: 39
Discharge: HOME | End: 2019-08-04
Attending: PHYSICIAN ASSISTANT | Admitting: PHYSICIAN ASSISTANT
Payer: COMMERCIAL

## 2019-08-04 VITALS
HEART RATE: 77 BPM | WEIGHT: 260 LBS | HEIGHT: 70 IN | DIASTOLIC BLOOD PRESSURE: 75 MMHG | BODY MASS INDEX: 37.22 KG/M2 | TEMPERATURE: 98.7 F | RESPIRATION RATE: 20 BRPM | SYSTOLIC BLOOD PRESSURE: 127 MMHG | OXYGEN SATURATION: 99 %

## 2019-08-04 DIAGNOSIS — S92.102A: ICD-10-CM

## 2019-08-04 DIAGNOSIS — S93.401D MODERATE RIGHT ANKLE SPRAIN, SUBSEQUENT ENCOUNTER: ICD-10-CM

## 2019-08-04 PROCEDURE — 25000132 ZZH RX MED GY IP 250 OP 250 PS 637: Performed by: PHYSICIAN ASSISTANT

## 2019-08-04 PROCEDURE — 99283 EMERGENCY DEPT VISIT LOW MDM: CPT | Mod: Z6 | Performed by: PHYSICIAN ASSISTANT

## 2019-08-04 PROCEDURE — 99283 EMERGENCY DEPT VISIT LOW MDM: CPT | Performed by: PHYSICIAN ASSISTANT

## 2019-08-04 RX ORDER — HYDROCODONE BITARTRATE AND ACETAMINOPHEN 5; 325 MG/1; MG/1
1 TABLET ORAL ONCE
Status: COMPLETED | OUTPATIENT
Start: 2019-08-04 | End: 2019-08-04

## 2019-08-04 RX ORDER — HYDROCODONE BITARTRATE AND ACETAMINOPHEN 5; 325 MG/1; MG/1
1 TABLET ORAL EVERY 4 HOURS PRN
COMMUNITY
Start: 2019-08-03 | End: 2019-08-13

## 2019-08-04 RX ORDER — OXYCODONE HYDROCHLORIDE 5 MG/1
5 TABLET ORAL EVERY 6 HOURS PRN
Qty: 12 TABLET | Refills: 0 | Status: ON HOLD | OUTPATIENT
Start: 2019-08-04 | End: 2019-08-08

## 2019-08-04 RX ORDER — OXYCODONE HYDROCHLORIDE 5 MG/1
5 TABLET ORAL ONCE
Status: COMPLETED | OUTPATIENT
Start: 2019-08-04 | End: 2019-08-04

## 2019-08-04 RX ADMIN — OXYCODONE HYDROCHLORIDE 5 MG: 5 TABLET ORAL at 21:09

## 2019-08-04 RX ADMIN — HYDROCODONE BITARTRATE AND ACETAMINOPHEN 1 TABLET: 5; 325 TABLET ORAL at 21:09

## 2019-08-04 ASSESSMENT — ENCOUNTER SYMPTOMS
WOUND: 0
ABDOMINAL PAIN: 0
SHORTNESS OF BREATH: 0
CHEST TIGHTNESS: 0
ADENOPATHY: 0
FEVER: 0
HEMATURIA: 0
CONFUSION: 0
CHILLS: 0
BACK PAIN: 0
BRUISES/BLEEDS EASILY: 0

## 2019-08-04 ASSESSMENT — MIFFLIN-ST. JEOR: SCORE: 2095.6

## 2019-08-04 NOTE — ED AVS SNAPSHOT
[facility]  [address]  [place] [address]  Phone:  [phone]  Fax:  [phone]                                    Sb Olivo   MRN: [id number]    Department:  [facility]   Date of Visit:  8/4/2019           After Visit Summary Signature Page    I have received my discharge instructions, and my questions have been answered. I have discussed any challenges I see with this plan with the nurse or doctor.    ..........................................................................................................................................  Patient/Patient Representative Signature      ..........................................................................................................................................  Patient Representative Print Name and Relationship to Patient    ..................................................               ................................................  Date                                   Time    ..........................................................................................................................................  Reviewed by Signature/Title    ...................................................              ..............................................  Date                                               Time          22EPIC Rev 08/18
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
## 2019-08-08 ENCOUNTER — HOSPITAL ENCOUNTER (OUTPATIENT)
Facility: OTHER | Age: 39
Discharge: HOME | End: 2019-08-09
Attending: PODIATRIST | Admitting: PODIATRIST
Payer: COMMERCIAL

## 2019-08-08 ENCOUNTER — ANESTHESIA EVENT (OUTPATIENT)
Dept: SURGERY | Facility: OTHER | Age: 39
End: 2019-08-08
Payer: COMMERCIAL

## 2019-08-08 ENCOUNTER — APPOINTMENT (OUTPATIENT)
Dept: GENERAL RADIOLOGY | Facility: OTHER | Age: 39
End: 2019-08-08
Attending: PODIATRIST
Payer: COMMERCIAL

## 2019-08-08 ENCOUNTER — ANESTHESIA (OUTPATIENT)
Dept: SURGERY | Facility: OTHER | Age: 39
End: 2019-08-08
Payer: COMMERCIAL

## 2019-08-08 ENCOUNTER — HOSPITAL ENCOUNTER (OUTPATIENT)
Dept: CT IMAGING | Facility: OTHER | Age: 39
End: 2019-08-08
Attending: PODIATRIST
Payer: COMMERCIAL

## 2019-08-08 ENCOUNTER — OFFICE VISIT (OUTPATIENT)
Dept: ORTHOPEDICS | Facility: OTHER | Age: 39
End: 2019-08-08
Attending: PHYSICIAN ASSISTANT
Payer: COMMERCIAL

## 2019-08-08 DIAGNOSIS — S92.109A CLOSED NONDISPLACED FRACTURE OF TALUS, UNSPECIFIED PORTION OF TALUS, UNSPECIFIED LATERALITY, INITIAL ENCOUNTER: Primary | ICD-10-CM

## 2019-08-08 DIAGNOSIS — S92.109A CLOSED NONDISPLACED FRACTURE OF TALUS, UNSPECIFIED PORTION OF TALUS, UNSPECIFIED LATERALITY, INITIAL ENCOUNTER: ICD-10-CM

## 2019-08-08 DIAGNOSIS — G89.18 POST-OP PAIN: Primary | ICD-10-CM

## 2019-08-08 DIAGNOSIS — Z78.9 DEEP VEIN THROMBOSIS (DVT) PROPHYLAXIS PRESCRIBED AT DISCHARGE: ICD-10-CM

## 2019-08-08 PROCEDURE — 25000125 ZZHC RX 250: Performed by: NURSE ANESTHETIST, CERTIFIED REGISTERED

## 2019-08-08 PROCEDURE — 40000278 XR SURGERY CARM FLUORO LESS THAN 5 MIN

## 2019-08-08 PROCEDURE — 27210794 ZZH OR GENERAL SUPPLY STERILE: Performed by: PODIATRIST

## 2019-08-08 PROCEDURE — 73700 CT LOWER EXTREMITY W/O DYE: CPT | Mod: RT

## 2019-08-08 PROCEDURE — 25000128 H RX IP 250 OP 636: Performed by: NURSE ANESTHETIST, CERTIFIED REGISTERED

## 2019-08-08 PROCEDURE — 28445 OPTX TALUS FRACTURE: CPT | Performed by: NURSE ANESTHETIST, CERTIFIED REGISTERED

## 2019-08-08 PROCEDURE — 71000014 ZZH RECOVERY PHASE 1 LEVEL 2 FIRST HR: Performed by: PODIATRIST

## 2019-08-08 PROCEDURE — 37000009 ZZH ANESTHESIA TECHNICAL FEE, EACH ADDTL 15 MIN: Performed by: PODIATRIST

## 2019-08-08 PROCEDURE — 36000063 ZZH SURGERY LEVEL 4 EA 15 ADDTL MIN: Performed by: PODIATRIST

## 2019-08-08 PROCEDURE — 25000125 ZZHC RX 250: Performed by: ANESTHESIOLOGY

## 2019-08-08 PROCEDURE — 25800030 ZZH RX IP 258 OP 636: Performed by: ANESTHESIOLOGY

## 2019-08-08 PROCEDURE — 37000008 ZZH ANESTHESIA TECHNICAL FEE, 1ST 30 MIN: Performed by: PODIATRIST

## 2019-08-08 PROCEDURE — 25000128 H RX IP 250 OP 636: Performed by: PODIATRIST

## 2019-08-08 PROCEDURE — C1713 ANCHOR/SCREW BN/BN,TIS/BN: HCPCS | Performed by: PODIATRIST

## 2019-08-08 PROCEDURE — 25000132 ZZH RX MED GY IP 250 OP 250 PS 637: Performed by: PODIATRIST

## 2019-08-08 PROCEDURE — 25000125 ZZHC RX 250: Performed by: PODIATRIST

## 2019-08-08 PROCEDURE — 40000306 ZZH STATISTIC PRE PROC ASSESS II: Performed by: PODIATRIST

## 2019-08-08 PROCEDURE — C1769 GUIDE WIRE: HCPCS | Performed by: PODIATRIST

## 2019-08-08 PROCEDURE — G0463 HOSPITAL OUTPT CLINIC VISIT: HCPCS | Mod: 25

## 2019-08-08 PROCEDURE — 25800030 ZZH RX IP 258 OP 636: Performed by: NURSE ANESTHETIST, CERTIFIED REGISTERED

## 2019-08-08 PROCEDURE — 36000065 ZZH SURGERY LEVEL 4 W FLUORO 1ST 30 MIN: Performed by: PODIATRIST

## 2019-08-08 PROCEDURE — 25000128 H RX IP 250 OP 636: Performed by: ANESTHESIOLOGY

## 2019-08-08 PROCEDURE — 99140 ANES COMP EMERGENCY COND: CPT | Performed by: NURSE ANESTHETIST, CERTIFIED REGISTERED

## 2019-08-08 DEVICE — IMP SCR ARTHREX QUICKFIX CANC PT 3.0X30MM TI AR-8730-30PT: Type: IMPLANTABLE DEVICE | Site: ANKLE | Status: FUNCTIONAL

## 2019-08-08 DEVICE — IMP SCR ARTHREX QUICKFIX CANC PT 3.0X36MM TI AR-8730-36PT: Type: IMPLANTABLE DEVICE | Site: ANKLE | Status: FUNCTIONAL

## 2019-08-08 DEVICE — IMP SCR ARTHREX QUICKFIX CANC PT 3.0X34MM TI AR-8730-34PT: Type: IMPLANTABLE DEVICE | Site: ANKLE | Status: FUNCTIONAL

## 2019-08-08 DEVICE — IMP SCR ARTHREX QUICKFIX CANC PT 3.0X32MM TI AR-8730-32PT: Type: IMPLANTABLE DEVICE | Site: ANKLE | Status: FUNCTIONAL

## 2019-08-08 RX ORDER — CLINDAMYCIN PHOSPHATE 900 MG/50ML
900 INJECTION, SOLUTION INTRAVENOUS
Status: DISCONTINUED | OUTPATIENT
Start: 2019-08-08 | End: 2019-08-08 | Stop reason: HOSPADM

## 2019-08-08 RX ORDER — MAGNESIUM HYDROXIDE 1200 MG/15ML
LIQUID ORAL PRN
Status: DISCONTINUED | OUTPATIENT
Start: 2019-08-08 | End: 2019-08-08 | Stop reason: HOSPADM

## 2019-08-08 RX ORDER — MEPERIDINE HYDROCHLORIDE 50 MG/ML
12.5 INJECTION INTRAMUSCULAR; INTRAVENOUS; SUBCUTANEOUS
Status: DISCONTINUED | OUTPATIENT
Start: 2019-08-08 | End: 2019-08-08 | Stop reason: HOSPADM

## 2019-08-08 RX ORDER — HYDROXYZINE PAMOATE 25 MG/1
25 CAPSULE ORAL EVERY 6 HOURS PRN
Status: DISCONTINUED | OUTPATIENT
Start: 2019-08-08 | End: 2019-08-09 | Stop reason: HOSPADM

## 2019-08-08 RX ORDER — SODIUM CHLORIDE, SODIUM LACTATE, POTASSIUM CHLORIDE, CALCIUM CHLORIDE 600; 310; 30; 20 MG/100ML; MG/100ML; MG/100ML; MG/100ML
INJECTION, SOLUTION INTRAVENOUS CONTINUOUS
Status: DISCONTINUED | OUTPATIENT
Start: 2019-08-08 | End: 2019-08-08 | Stop reason: HOSPADM

## 2019-08-08 RX ORDER — CLINDAMYCIN PHOSPHATE 900 MG/50ML
900 INJECTION, SOLUTION INTRAVENOUS SEE ADMIN INSTRUCTIONS
Status: DISCONTINUED | OUTPATIENT
Start: 2019-08-08 | End: 2019-08-08 | Stop reason: HOSPADM

## 2019-08-08 RX ORDER — ACETAMINOPHEN 325 MG/1
650 TABLET ORAL EVERY 4 HOURS PRN
Qty: 100 TABLET | Refills: 0 | Status: SHIPPED | OUTPATIENT
Start: 2019-08-08 | End: 2020-03-03

## 2019-08-08 RX ORDER — OXYCODONE HYDROCHLORIDE 5 MG/1
5 TABLET ORAL EVERY 6 HOURS PRN
Qty: 30 TABLET | Refills: 0 | Status: SHIPPED | OUTPATIENT
Start: 2019-08-08 | End: 2019-08-11

## 2019-08-08 RX ORDER — ONDANSETRON 4 MG/1
4 TABLET, ORALLY DISINTEGRATING ORAL EVERY 6 HOURS PRN
Status: DISCONTINUED | OUTPATIENT
Start: 2019-08-08 | End: 2019-08-09 | Stop reason: HOSPADM

## 2019-08-08 RX ORDER — HYDRALAZINE HYDROCHLORIDE 20 MG/ML
2.5-5 INJECTION INTRAMUSCULAR; INTRAVENOUS EVERY 10 MIN PRN
Status: DISCONTINUED | OUTPATIENT
Start: 2019-08-08 | End: 2019-08-08 | Stop reason: HOSPADM

## 2019-08-08 RX ORDER — KETOROLAC TROMETHAMINE 30 MG/ML
INJECTION, SOLUTION INTRAMUSCULAR; INTRAVENOUS PRN
Status: DISCONTINUED | OUTPATIENT
Start: 2019-08-08 | End: 2019-08-08

## 2019-08-08 RX ORDER — NALOXONE HYDROCHLORIDE 0.4 MG/ML
.1-.4 INJECTION, SOLUTION INTRAMUSCULAR; INTRAVENOUS; SUBCUTANEOUS
Status: DISCONTINUED | OUTPATIENT
Start: 2019-08-08 | End: 2019-08-09 | Stop reason: HOSPADM

## 2019-08-08 RX ORDER — PROPOFOL 10 MG/ML
INJECTION, EMULSION INTRAVENOUS CONTINUOUS PRN
Status: DISCONTINUED | OUTPATIENT
Start: 2019-08-08 | End: 2019-08-08

## 2019-08-08 RX ORDER — SODIUM CHLORIDE, SODIUM LACTATE, POTASSIUM CHLORIDE, CALCIUM CHLORIDE 600; 310; 30; 20 MG/100ML; MG/100ML; MG/100ML; MG/100ML
INJECTION, SOLUTION INTRAVENOUS CONTINUOUS
Status: DISCONTINUED | OUTPATIENT
Start: 2019-08-08 | End: 2019-08-09 | Stop reason: HOSPADM

## 2019-08-08 RX ORDER — NALOXONE HYDROCHLORIDE 0.4 MG/ML
.1-.4 INJECTION, SOLUTION INTRAMUSCULAR; INTRAVENOUS; SUBCUTANEOUS
Status: DISCONTINUED | OUTPATIENT
Start: 2019-08-08 | End: 2019-08-08 | Stop reason: HOSPADM

## 2019-08-08 RX ORDER — FENTANYL CITRATE 50 UG/ML
50 INJECTION, SOLUTION INTRAMUSCULAR; INTRAVENOUS
Status: COMPLETED | OUTPATIENT
Start: 2019-08-08 | End: 2019-08-08

## 2019-08-08 RX ORDER — ONDANSETRON 2 MG/ML
INJECTION INTRAMUSCULAR; INTRAVENOUS PRN
Status: DISCONTINUED | OUTPATIENT
Start: 2019-08-08 | End: 2019-08-08

## 2019-08-08 RX ORDER — HYDROMORPHONE HYDROCHLORIDE 1 MG/ML
.3-.5 INJECTION, SOLUTION INTRAMUSCULAR; INTRAVENOUS; SUBCUTANEOUS EVERY 10 MIN PRN
Status: DISCONTINUED | OUTPATIENT
Start: 2019-08-08 | End: 2019-08-08 | Stop reason: HOSPADM

## 2019-08-08 RX ORDER — HYDROXYZINE HYDROCHLORIDE 10 MG/1
10 TABLET, FILM COATED ORAL EVERY 6 HOURS PRN
Qty: 30 TABLET | Refills: 0 | Status: SHIPPED | OUTPATIENT
Start: 2019-08-08 | End: 2020-03-02

## 2019-08-08 RX ORDER — MORPHINE SULFATE 0.5 MG/ML
INJECTION, SOLUTION EPIDURAL; INTRATHECAL; INTRAVENOUS PRN
Status: DISCONTINUED | OUTPATIENT
Start: 2019-08-08 | End: 2019-08-08

## 2019-08-08 RX ORDER — FENTANYL CITRATE 50 UG/ML
INJECTION, SOLUTION INTRAMUSCULAR; INTRAVENOUS PRN
Status: DISCONTINUED | OUTPATIENT
Start: 2019-08-08 | End: 2019-08-08

## 2019-08-08 RX ORDER — LIDOCAINE HYDROCHLORIDE 20 MG/ML
INJECTION, SOLUTION INFILTRATION; PERINEURAL PRN
Status: DISCONTINUED | OUTPATIENT
Start: 2019-08-08 | End: 2019-08-08

## 2019-08-08 RX ORDER — ALBUTEROL SULFATE 0.83 MG/ML
2.5 SOLUTION RESPIRATORY (INHALATION) EVERY 4 HOURS PRN
Status: DISCONTINUED | OUTPATIENT
Start: 2019-08-08 | End: 2019-08-08 | Stop reason: HOSPADM

## 2019-08-08 RX ORDER — OXYCODONE HYDROCHLORIDE 5 MG/1
5-10 TABLET ORAL
Status: DISCONTINUED | OUTPATIENT
Start: 2019-08-08 | End: 2019-08-09 | Stop reason: HOSPADM

## 2019-08-08 RX ORDER — LIDOCAINE 40 MG/G
CREAM TOPICAL
Status: DISCONTINUED | OUTPATIENT
Start: 2019-08-08 | End: 2019-08-08 | Stop reason: HOSPADM

## 2019-08-08 RX ORDER — ONDANSETRON 4 MG/1
4 TABLET, ORALLY DISINTEGRATING ORAL EVERY 30 MIN PRN
Status: DISCONTINUED | OUTPATIENT
Start: 2019-08-08 | End: 2019-08-08 | Stop reason: HOSPADM

## 2019-08-08 RX ORDER — FENTANYL CITRATE 50 UG/ML
25-50 INJECTION, SOLUTION INTRAMUSCULAR; INTRAVENOUS EVERY 5 MIN PRN
Status: DISCONTINUED | OUTPATIENT
Start: 2019-08-08 | End: 2019-08-08 | Stop reason: HOSPADM

## 2019-08-08 RX ORDER — BUPIVACAINE HYDROCHLORIDE 7.5 MG/ML
INJECTION, SOLUTION INTRASPINAL PRN
Status: DISCONTINUED | OUTPATIENT
Start: 2019-08-08 | End: 2019-08-08

## 2019-08-08 RX ORDER — NALOXONE HYDROCHLORIDE 0.4 MG/ML
.1-.4 INJECTION, SOLUTION INTRAMUSCULAR; INTRAVENOUS; SUBCUTANEOUS
Status: DISCONTINUED | OUTPATIENT
Start: 2019-08-08 | End: 2019-08-08

## 2019-08-08 RX ORDER — ONDANSETRON 2 MG/ML
4 INJECTION INTRAMUSCULAR; INTRAVENOUS EVERY 30 MIN PRN
Status: DISCONTINUED | OUTPATIENT
Start: 2019-08-08 | End: 2019-08-08 | Stop reason: HOSPADM

## 2019-08-08 RX ORDER — OXYCODONE HYDROCHLORIDE 5 MG/1
5 TABLET ORAL EVERY 6 HOURS PRN
Qty: 12 TABLET | Refills: 0 | Status: SHIPPED | OUTPATIENT
Start: 2019-08-08 | End: 2019-08-09

## 2019-08-08 RX ORDER — OXYCODONE HYDROCHLORIDE 5 MG/1
5 TABLET ORAL EVERY 4 HOURS PRN
Status: DISCONTINUED | OUTPATIENT
Start: 2019-08-08 | End: 2019-08-08

## 2019-08-08 RX ORDER — DEXAMETHASONE SODIUM PHOSPHATE 4 MG/ML
4 INJECTION, SOLUTION INTRA-ARTICULAR; INTRALESIONAL; INTRAMUSCULAR; INTRAVENOUS; SOFT TISSUE EVERY 10 MIN PRN
Status: DISCONTINUED | OUTPATIENT
Start: 2019-08-08 | End: 2019-08-08 | Stop reason: HOSPADM

## 2019-08-08 RX ORDER — CALCIUM CARBONATE 500 MG/1
1000 TABLET, CHEWABLE ORAL 4 TIMES DAILY PRN
Status: DISCONTINUED | OUTPATIENT
Start: 2019-08-08 | End: 2019-08-09 | Stop reason: HOSPADM

## 2019-08-08 RX ORDER — ACETAMINOPHEN 325 MG/1
975 TABLET ORAL EVERY 8 HOURS
Status: DISCONTINUED | OUTPATIENT
Start: 2019-08-08 | End: 2019-08-09 | Stop reason: HOSPADM

## 2019-08-08 RX ORDER — LIDOCAINE HYDROCHLORIDE 10 MG/ML
INJECTION, SOLUTION INFILTRATION; PERINEURAL PRN
Status: DISCONTINUED | OUTPATIENT
Start: 2019-08-08 | End: 2019-08-08

## 2019-08-08 RX ORDER — BUPIVACAINE HYDROCHLORIDE 5 MG/ML
INJECTION, SOLUTION PERINEURAL PRN
Status: DISCONTINUED | OUTPATIENT
Start: 2019-08-08 | End: 2019-08-08 | Stop reason: HOSPADM

## 2019-08-08 RX ORDER — IBUPROFEN 600 MG/1
600 TABLET, FILM COATED ORAL EVERY 6 HOURS PRN
Qty: 30 TABLET | Refills: 0 | Status: SHIPPED | OUTPATIENT
Start: 2019-08-08 | End: 2020-03-03

## 2019-08-08 RX ORDER — ONDANSETRON 2 MG/ML
4 INJECTION INTRAMUSCULAR; INTRAVENOUS EVERY 6 HOURS PRN
Status: DISCONTINUED | OUTPATIENT
Start: 2019-08-08 | End: 2019-08-09 | Stop reason: HOSPADM

## 2019-08-08 RX ORDER — LIDOCAINE 40 MG/G
CREAM TOPICAL
Status: DISCONTINUED | OUTPATIENT
Start: 2019-08-08 | End: 2019-08-09 | Stop reason: HOSPADM

## 2019-08-08 RX ORDER — DEXAMETHASONE SODIUM PHOSPHATE 4 MG/ML
INJECTION, SOLUTION INTRA-ARTICULAR; INTRALESIONAL; INTRAMUSCULAR; INTRAVENOUS; SOFT TISSUE PRN
Status: DISCONTINUED | OUTPATIENT
Start: 2019-08-08 | End: 2019-08-08

## 2019-08-08 RX ORDER — FENTANYL CITRATE 50 UG/ML
50 INJECTION, SOLUTION INTRAMUSCULAR; INTRAVENOUS ONCE
Status: COMPLETED | OUTPATIENT
Start: 2019-08-08 | End: 2019-08-08

## 2019-08-08 RX ORDER — IBUPROFEN 600 MG/1
600 TABLET, FILM COATED ORAL EVERY 6 HOURS PRN
Status: DISCONTINUED | OUTPATIENT
Start: 2019-08-08 | End: 2019-08-09 | Stop reason: HOSPADM

## 2019-08-08 RX ORDER — EPHEDRINE SULFATE 50 MG/ML
INJECTION, SOLUTION INTRAVENOUS PRN
Status: DISCONTINUED | OUTPATIENT
Start: 2019-08-08 | End: 2019-08-08

## 2019-08-08 RX ADMIN — PROPOFOL 100 MCG/KG/MIN: 10 INJECTION, EMULSION INTRAVENOUS at 16:29

## 2019-08-08 RX ADMIN — EPHEDRINE SULFATE 10 MG: 50 INJECTION, SOLUTION INTRAVENOUS at 16:51

## 2019-08-08 RX ADMIN — CLINDAMYCIN PHOSPHATE 900 MG: 18 INJECTION, SOLUTION INTRAVENOUS at 16:15

## 2019-08-08 RX ADMIN — ONDANSETRON 4 MG: 2 INJECTION INTRAMUSCULAR; INTRAVENOUS at 16:31

## 2019-08-08 RX ADMIN — KETOROLAC TROMETHAMINE 30 MG: 30 INJECTION, SOLUTION INTRAMUSCULAR at 17:41

## 2019-08-08 RX ADMIN — FENTANYL CITRATE 50 MCG: 50 INJECTION, SOLUTION INTRAMUSCULAR; INTRAVENOUS at 16:15

## 2019-08-08 RX ADMIN — PHENYLEPHRINE HYDROCHLORIDE 100 MCG: 10 INJECTION INTRAVENOUS at 16:48

## 2019-08-08 RX ADMIN — ACETAMINOPHEN 975 MG: 325 TABLET, FILM COATED ORAL at 21:37

## 2019-08-08 RX ADMIN — MIDAZOLAM 2 MG: 1 INJECTION INTRAMUSCULAR; INTRAVENOUS at 16:15

## 2019-08-08 RX ADMIN — LIDOCAINE HYDROCHLORIDE 3 ML: 10 INJECTION, SOLUTION INFILTRATION; PERINEURAL at 16:24

## 2019-08-08 RX ADMIN — ONDANSETRON HYDROCHLORIDE 4 MG: 2 INJECTION, SOLUTION INTRAMUSCULAR; INTRAVENOUS at 20:10

## 2019-08-08 RX ADMIN — EPHEDRINE SULFATE 10 MG: 50 INJECTION, SOLUTION INTRAVENOUS at 16:54

## 2019-08-08 RX ADMIN — LIDOCAINE HYDROCHLORIDE 60 MG: 20 INJECTION, SOLUTION INFILTRATION; PERINEURAL at 16:29

## 2019-08-08 RX ADMIN — SODIUM CHLORIDE, POTASSIUM CHLORIDE, SODIUM LACTATE AND CALCIUM CHLORIDE: 600; 310; 30; 20 INJECTION, SOLUTION INTRAVENOUS at 13:40

## 2019-08-08 RX ADMIN — PHENYLEPHRINE HYDROCHLORIDE 100 MCG: 10 INJECTION INTRAVENOUS at 16:52

## 2019-08-08 RX ADMIN — DEXAMETHASONE SODIUM PHOSPHATE 4 MG: 4 INJECTION, SOLUTION INTRA-ARTICULAR; INTRALESIONAL; INTRAMUSCULAR; INTRAVENOUS; SOFT TISSUE at 16:31

## 2019-08-08 RX ADMIN — PHENYLEPHRINE HYDROCHLORIDE 0.4 MCG/KG/MIN: 10 INJECTION INTRAVENOUS at 16:57

## 2019-08-08 RX ADMIN — PHENYLEPHRINE HYDROCHLORIDE 100 MCG: 10 INJECTION INTRAVENOUS at 16:38

## 2019-08-08 RX ADMIN — BUPIVACAINE HYDROCHLORIDE IN DEXTROSE 2 ML: 7.5 INJECTION, SOLUTION SUBARACHNOID at 16:28

## 2019-08-08 RX ADMIN — LIDOCAINE HYDROCHLORIDE 0.1 ML: 10 INJECTION, SOLUTION EPIDURAL; INFILTRATION; INTRACAUDAL; PERINEURAL at 13:40

## 2019-08-08 RX ADMIN — FENTANYL CITRATE 50 MCG: 50 INJECTION, SOLUTION INTRAMUSCULAR; INTRAVENOUS at 15:38

## 2019-08-08 RX ADMIN — FENTANYL CITRATE 50 MCG: 50 INJECTION, SOLUTION INTRAMUSCULAR; INTRAVENOUS at 13:54

## 2019-08-08 RX ADMIN — EPHEDRINE SULFATE 5 MG: 50 INJECTION, SOLUTION INTRAVENOUS at 16:42

## 2019-08-08 RX ADMIN — PHENYLEPHRINE HYDROCHLORIDE 100 MCG: 10 INJECTION INTRAVENOUS at 16:51

## 2019-08-08 RX ADMIN — MORPHINE SULFATE 0.1 MG: 0.5 INJECTION, SOLUTION EPIDURAL; INTRATHECAL; INTRAVENOUS at 16:28

## 2019-08-08 RX ADMIN — PHENYLEPHRINE HYDROCHLORIDE 200 MCG: 10 INJECTION INTRAVENOUS at 16:41

## 2019-08-08 RX ADMIN — PHENYLEPHRINE HYDROCHLORIDE 200 MCG: 10 INJECTION INTRAVENOUS at 16:54

## 2019-08-08 RX ADMIN — SODIUM CHLORIDE, POTASSIUM CHLORIDE, SODIUM LACTATE AND CALCIUM CHLORIDE: 600; 310; 30; 20 INJECTION, SOLUTION INTRAVENOUS at 16:40

## 2019-08-08 RX ADMIN — EPHEDRINE SULFATE 5 MG: 50 INJECTION, SOLUTION INTRAVENOUS at 16:48

## 2019-08-08 ASSESSMENT — ACTIVITIES OF DAILY LIVING (ADL)
AMBULATION: 0-->INDEPENDENT
RETIRED_EATING: 0-->INDEPENDENT
TRANSFERRING: 0-->INDEPENDENT
COGNITION: 0 - NO COGNITION ISSUES REPORTED
DRESS: 0-->INDEPENDENT
TOILETING: 0-->INDEPENDENT
RETIRED_COMMUNICATION: 0-->UNDERSTANDS/COMMUNICATES WITHOUT DIFFICULTY
SWALLOWING: 0-->SWALLOWS FOODS/LIQUIDS WITHOUT DIFFICULTY
FALL_HISTORY_WITHIN_LAST_SIX_MONTHS: NO
BATHING: 0-->INDEPENDENT

## 2019-08-08 ASSESSMENT — MIFFLIN-ST. JEOR: SCORE: 2127.35

## 2019-08-08 NOTE — ANESTHESIA POSTPROCEDURE EVALUATION
Patient: Sb Olivo    Procedure(s):  OPEN REDUCTION INTERNAL FIXATION, FRACTURE, Talus    Diagnosis:talus fracture  Diagnosis Additional Information: No value filed.    Anesthesia Type:  General, LMA    Note:  Anesthesia Post Evaluation    Patient location during evaluation: Bedside  Patient participation: Able to fully participate in evaluation  Level of consciousness: awake and alert  Pain management: adequate  Airway patency: patent  Cardiovascular status: acceptable  Respiratory status: acceptable  Hydration status: acceptable  PONV: none     Anesthetic complications: None          Last vitals:  Vitals:    08/08/19 1815 08/08/19 1820 08/08/19 1825   BP: 119/54 120/59 119/62   Pulse: 73 79 74   Resp: 18 18 18   Temp:  98.6  F (37  C)    SpO2:            Electronically Signed By: ALEX Iraheta CRNA  August 8, 2019  6:32 PM

## 2019-08-08 NOTE — OR NURSING
PACU Transfer Note    Sb Olivo was transferred to [email or web address] via cart.  Equipment used for transport:  none.  Accompanied by:  RN  Prescriptions were: signed and in chart    PACU Respiratory Event Documentation     1) Episodes of Apnea greater than or equal to 10 seconds: no    2) Bradypnea - less than 8 breaths per minute: no    3) Pain score on 0 to 10 scale: 0    4) Pain-sedation mismatch (yes or no): no    5) Repeated 02 desaturation less than 90% (yes or no): no    Anesthesia notified? (yes or no): na    Any of the above events occuring repeatedly in separate 30 minute intervals may be considered recurrent PACU respiratory events.    Patient stable and meets phase 1 discharge criteria for transport from PACU.    Report given to Amanda

## 2019-08-08 NOTE — PROGRESS NOTES
[facility] ADMISSION NOTE    Patient admitted to room 352 at approximately 1850 via bed from emergency room. Patient was accompanied by spouse.     Verbal SBAR report received from TREASURE Molina prior to patient arrival.     Patient trasferred to bed via air kade. Patient alert and oriented X 3. The patient is not having any pain. 0-10 Pain Scale: 4. Admission vital signs: Blood pressure 104/66, pulse 74, temperature 97.7  F (36.5  C), temperature source Tympanic, resp. rate 18, height 1.829 m (6'), weight 117.9 kg (260 lb), SpO2 97 %. Patient was oriented to plan of care, call light, bed controls, tv, telephone, bathroom and visiting hours.       Manuel Risk Assessment  Sensory Perception: 4-->no impairment  Moisture: 4-->rarely moist  Activity: 3-->walks occasionally  Mobility: 3-->slightly limited  Nutrition: 4-->excellent  Friction and Shear: 3-->no apparent problem  Manuel Score: 21    Narcotic script given to wife Brooke.     Amanda Meng

## 2019-08-08 NOTE — ANESTHESIA PREPROCEDURE EVALUATION
Anesthesia Pre-Procedure Evaluation    Patient: Sb Olivo   MRN: [id number] : 1980          Preoperative Diagnosis: talus fracture    Procedure(s):  OPEN REDUCTION INTERNAL FIXATION, FRACTURE, Talus    Past Medical History:   Diagnosis Date     Psoriasis      Past Surgical History:   Procedure Laterality Date     LIVER BIOPSY  2017       Anesthesia Evaluation     . Pt has had prior anesthetic. Type: General           ROS/MED HX    ENT/Pulmonary:  - neg pulmonary ROS     Neurologic:  - neg neurologic ROS     Cardiovascular:     (+) hypertension----. : . . . :. .       METS/Exercise Tolerance:     Hematologic:  - neg hematologic  ROS       Musculoskeletal:  - neg musculoskeletal ROS       GI/Hepatic:     (+) hepatitis liver disease,       Renal/Genitourinary:         Endo:     (+) type I DM, .      Psychiatric:  - neg psychiatric ROS       Infectious Disease:  - neg infectious disease ROS       Malignancy:      - no malignancy   Other:    - neg other ROS                      Physical Exam  Normal systems: cardiovascular, pulmonary and dental    Airway   Mallampati: I  TM distance: >3 FB  Neck ROM: full    Dental     Cardiovascular       Pulmonary             Lab Results   Component Value Date    WBC 8.5 2019    HGB 13.9 2019    HCT 42.6 2019     2019     2019    POTASSIUM 3.4 (L) 2019    CHLORIDE 101 2019    CO2 25 2019    BUN 11 2019    CR 1.00 2019     (H) 2019    GRAHAM 9.4 2019    MAG 2.0 2017    ALBUMIN 4.4 2017    PROTTOTAL 8.2 2017    ALKPHOS 66 2017    BILITOTAL 0.2 (L) 2017       Preop Vitals  BP Readings from Last 3 Encounters:   19 123/77   19 127/75   19 138/72    Pulse Readings from Last 3 Encounters:   19 77   19 78   19 70      Resp Readings from Last 3 Encounters:   19 18   19 20   19 14    SpO2 Readings from Last 3  Encounters:   08/08/19 98%   08/04/19 99%   07/11/19 97%      Temp Readings from Last 1 Encounters:   08/08/19 96.6  F (35.9  C) (Tympanic)    Ht Readings from Last 1 Encounters:   08/08/19 1.829 m (6')      Wt Readings from Last 1 Encounters:   08/08/19 117.9 kg (260 lb)    Estimated body mass index is 35.26 kg/m  as calculated from the following:    Height as of this encounter: 1.829 m (6').    Weight as of this encounter: 117.9 kg (260 lb).       Anesthesia Plan      History & Physical Review      ASA Status:  2 emergent.    NPO Status:  > 8 hours    Plan for Spinal Maintenance will be TIVA.    PONV prophylaxis:  Ondansetron (or other 5HT-3) and Dexamethasone or Solumedrol       Postoperative Care  Postoperative pain management:  IV analgesics and Neuraxial analgesia.      Consents  Anesthetic plan, risks, benefits and alternatives discussed with:  Patient.  Use of blood products discussed: No .   .                 Frederic Mendoza DO

## 2019-08-08 NOTE — ANESTHESIA CARE TRANSFER NOTE
Patient: Sb Olivo    Procedure(s):  OPEN REDUCTION INTERNAL FIXATION, FRACTURE, Talus    Diagnosis: talus fracture  Diagnosis Additional Information: No value filed.    Anesthesia Type:   General, LMA     Note:  Airway :Room Air  Patient transferred to:PACU  Handoff Report: Identifed the Patient, Identified the Reponsible Provider, Reviewed the pertinent medical history, Discussed the surgical course, Reviewed Intra-OP anesthesia mangement and issues during anesthesia, Set expectations for post-procedure period and Allowed opportunity for questions and acknowledgement of understanding      Vitals: (Last set prior to Anesthesia Care Transfer)    CRNA VITALS  8/8/2019 1724 - 8/8/2019 1759      8/8/2019             Resp Rate (set):  10                Electronically Signed By: ALEX Iraheta CRNA  August 8, 2019  5:59 PM

## 2019-08-09 ENCOUNTER — APPOINTMENT (OUTPATIENT)
Dept: PHYSICAL THERAPY | Facility: OTHER | Age: 39
End: 2019-08-09
Attending: PODIATRIST
Payer: COMMERCIAL

## 2019-08-09 VITALS
WEIGHT: 260 LBS | RESPIRATION RATE: 16 BRPM | TEMPERATURE: 97.3 F | HEART RATE: 74 BPM | SYSTOLIC BLOOD PRESSURE: 152 MMHG | DIASTOLIC BLOOD PRESSURE: 62 MMHG | BODY MASS INDEX: 35.21 KG/M2 | OXYGEN SATURATION: 98 % | HEIGHT: 72 IN

## 2019-08-09 LAB — HGB BLD-MCNC: 12.6 G/DL (ref 13.3–17.7)

## 2019-08-09 PROCEDURE — 85018 HEMOGLOBIN: CPT | Performed by: PODIATRIST

## 2019-08-09 PROCEDURE — 25000132 ZZH RX MED GY IP 250 OP 250 PS 637: Performed by: PODIATRIST

## 2019-08-09 PROCEDURE — 36415 COLL VENOUS BLD VENIPUNCTURE: CPT | Performed by: PODIATRIST

## 2019-08-09 PROCEDURE — 97161 PT EVAL LOW COMPLEX 20 MIN: CPT | Mod: GP

## 2019-08-09 RX ADMIN — OXYCODONE HYDROCHLORIDE 10 MG: 5 TABLET ORAL at 01:16

## 2019-08-09 RX ADMIN — RIVAROXABAN 10 MG: 10 TABLET, FILM COATED ORAL at 08:56

## 2019-08-09 RX ADMIN — ACETAMINOPHEN 975 MG: 325 TABLET, FILM COATED ORAL at 04:20

## 2019-08-09 NOTE — PROGRESS NOTES
Gave patient discharge orders and answered questions.  PT worked with him concerning transferring. Bilateral leg dressings in place. Wheelchair ride to his father Parul's vehicle at 1020. Transferred well. Was in touch with Vianney, his wife, about discharge instructions also.
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
## 2019-08-09 NOTE — PHARMACY - DISCHARGE MEDICATION RECONCILIATION AND EDUCATION
Pharmacy:  Discharge Counseling and Medication Reconciliation    Sb Olivo  [address] [address]  [phone] (home)   38 year old male  PCP: No Ref-Primary, Physician    Allergies: Penicillins; Amoxicillin; and Apremilast    Discharge Counseling:    Pharmacist met with patient (and/or family) today to review the medication portion of the After Visit Summary (with an emphasis on NEW medications) and to address patient's questions/concerns.    Summary of Education: Reviewed acetaminophen, hydroxyzine, ibuprofen, and rivaroxaban verbally and in print    Materials Provided:  MedCounselor sheets printed from Clinical Pharmacology on: acetaminophen, hydroxyzine, ibuprofen, and rivaroxaban    Discharge Medication Reconciliation:    Chuyita Tse [facility] has reviewed the patient's discharge medication orders and has compared them to the inpatient medication administration record and to what the patient was taking prior to admission - any discrepancies have been resolved.    It has been determined that the patient has an adequate supply of medications available or which can be obtained from the patient's preferred pharmacy, which he has confirmed as: [facility]. An updated medication list will be faxed to the patient's pharmacy.    Thank you for the consult.    Chuyita Tse [facility]........August 9, 2019 9:47 AM

## 2019-08-09 NOTE — PHARMACY
Pharmacy - Transfer Medication Reconciliation     The patient's transfer medication orders have been compared to the medication administration record and to the Prior to Admissions Medications list - any noted discrepancies were resolved with the MD.     Thank you. Pharmacy will continue to monitor.     Charlee Alvarez [facility] ....................  8/8/2019   7:28 PM

## 2019-08-09 NOTE — ADDENDUM NOTE
Addendum  created 08/09/19 1254 by Frederic Mendoza DO    Attestation recorded in Intraprocedure, Intraprocedure Attestations filed

## 2019-08-09 NOTE — PLAN OF CARE
Physical Therapy Discharge Summary    Reason for therapy discharge:    Discharged to home.    Progress towards therapy goal(s). See goals on Care Plan in [facility] electronic health record for goal details.  Goals met    Therapy recommendation(s):    Possible OP therapy for strengthening and gait training after WB status change per MD recommendation.
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
## 2019-08-09 NOTE — PHARMACY - DISCHARGE MEDICATION RECONCILIATION AND EDUCATION
[facility]  Part of [address] [address]    August 9, 2019    Dear Pharmacist,    Your customer, Sb Olivo, born on 1980, was recently discharged from [facility].  We have updated his medication list and want to alert you to the following:       Review of your medicines      START taking      Dose / Directions   acetaminophen 325 MG tablet  Commonly known as:  TYLENOL  Used for:  Post-op pain      Dose:  650 mg  Take 2 tablets (650 mg) by mouth every 4 hours as needed for other (mild pain)  Quantity:  100 tablet  Refills:  0     hydrOXYzine 10 MG tablet  Commonly known as:  ATARAX  Used for:  Post-op pain      Dose:  10 mg  Take 1 tablet (10 mg) by mouth every 6 hours as needed for itching or anxiety (with pain, moderate pain)  Quantity:  30 tablet  Refills:  0     ibuprofen 600 MG tablet  Commonly known as:  ADVIL/MOTRIN  Used for:  Post-op pain      Dose:  600 mg  Take 1 tablet (600 mg) by mouth every 6 hours as needed for pain (mild)  Quantity:  30 tablet  Refills:  0     rivaroxaban ANTICOAGULANT 10 MG Tabs tablet  Commonly known as:  XARELTO  Used for:  Deep vein thrombosis (DVT) prophylaxis prescribed at discharge      Dose:  10 mg  Start taking on:  8/10/2019  Take 1 tablet (10 mg) by mouth daily  Quantity:  30 tablet  Refills:  0        CONTINUE these medicines which may have CHANGED, or have new prescriptions. If we are uncertain of the size of tablets/capsules you have at home, strength may be listed as something that might have changed.      Dose / Directions   oxyCODONE 5 MG tablet  Commonly known as:  ROXICODONE  This may have changed:  reasons to take this  Used for:  Post-op pain      Dose:  5 mg  Take 1 tablet (5 mg) by mouth every 6 hours as needed for pain  Quantity:  30 tablet  Refills:  0        CONTINUE these medicines which have NOT CHANGED      Dose / Directions   HYDROcodone-acetaminophen 5-325 MG  tablet  Commonly known as:  NORCO      Dose:  1 tablet  Take 1 tablet by mouth every 4 hours as needed for moderate pain  Refills:  0     lisinopril 20 MG tablet  Commonly known as:  PRINIVIL/ZESTRIL  Used for:  Benign essential HTN      Dose:  20 mg  Take 1 tablet (20 mg) by mouth daily  Quantity:  90 tablet  Refills:  3     metFORMIN 500 MG tablet  Commonly known as:  GLUCOPHAGE      Dose:  500 mg  Take 500 mg by mouth 2 times daily (with meals)  Refills:  2     STELARA 90 MG/ML  Generic drug:  ustekinumab      Dose:  90 mg  Inject 90 mg Subcutaneous every 3 months From [facility]  Refills:  0     venlafaxine 150 MG 24 hr capsule  Commonly known as:  EFFEXOR-XR      Dose:  150 mg  Take 150 mg by mouth daily  Refills:  0           Where to get your medicines      These medications were sent to [id number] - [place], [place] - [address], [facility] [address]    Phone:  [phone]     acetaminophen 325 MG tablet    hydrOXYzine 10 MG tablet    ibuprofen 600 MG tablet    rivaroxaban ANTICOAGULANT 10 MG Tabs tablet     Some of these will need a paper prescription and others can be bought over the counter. Ask your nurse if you have questions.    Bring a paper prescription for each of these medications    oxyCODONE 5 MG tablet         We also reviewed Sb Olivo's allergy list and updated it as needed:  Allergies: Penicillins; Amoxicillin; and Apremilast    Thank you for continuing to care for Sb Olivo.  We look forward to working together with you in the future.    Sincerely,  Chuyita Tse, [facility]

## 2019-08-09 NOTE — PLAN OF CARE
Pt is tolerating a regular diet and drinking plenty of water. Pt voided 175 mL of pravin urine. Pt has been experiencing bilateral incision pain at the ankles/feet and described it as throbbing. PRN Marily given and pt said it helped decrease pain. Tingling has improved. Pt still continues to have mild tingling in left foot and moderate in right but says it is improving. Pt is able to lift both legs and wiggle his toes. Pt is able to feel touch in his bilateral toes. Pt wore home Cpap for some of the night. BP (!) 152/62   Pulse 74   Temp 97.3  F (36.3  C) (Tympanic)   Resp 16   Ht 1.829 m (6')   Wt 117.9 kg (260 lb)   SpO2 98%   BMI 35.26 kg/m
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
## 2019-08-14 ENCOUNTER — OFFICE VISIT (OUTPATIENT)
Dept: FAMILY MEDICINE | Facility: OTHER | Age: 39
End: 2019-08-14
Attending: FAMILY MEDICINE
Payer: COMMERCIAL

## 2019-08-14 VITALS
SYSTOLIC BLOOD PRESSURE: 116 MMHG | HEART RATE: 68 BPM | DIASTOLIC BLOOD PRESSURE: 87 MMHG | TEMPERATURE: 97.6 F | RESPIRATION RATE: 16 BRPM

## 2019-08-14 DIAGNOSIS — I10 BENIGN ESSENTIAL HTN: Primary | ICD-10-CM

## 2019-08-14 PROCEDURE — 99213 OFFICE O/P EST LOW 20 MIN: CPT | Performed by: FAMILY MEDICINE

## 2019-08-14 ASSESSMENT — PAIN SCALES - GENERAL: PAINLEVEL: NO PAIN (0)

## 2019-08-14 ASSESSMENT — ENCOUNTER SYMPTOMS
SHORTNESS OF BREATH: 0
FATIGUE: 0

## 2019-08-14 NOTE — PROGRESS NOTES
SUBJECTIVE:   Sb Olivo is a 38 year old male who presents to clinic today for the following health issues:    HPI  Hypertension follow up.  He had bilateral ankle ORIF recently.  In a wheelchair now.  At home blood pressure readings are almost always in the 120/80 range.  No side effects from the meds at all.  CMP in July showed elevated LFTs. Uses EtoH, at about 6-10 a week.      Patient Active Problem List    Diagnosis Date Noted     Post-operative pain 08/08/2019     Priority: Medium     Benign essential HTN 07/11/2019     Priority: Medium     Psoriatic arthritis (H) 07/11/2019     Priority: Medium     Controlled type 2 diabetes mellitus without complication, without long-term current use of insulin (H) 07/11/2019     Priority: Medium     CALDERON (nonalcoholic steatohepatitis) 07/11/2019     Priority: Medium     Past Surgical History:   Procedure Laterality Date     LIVER BIOPSY  2017     OPEN REDUCTION INTERNAL FIXATION ANKLE Bilateral 8/8/2019    Procedure: OPEN REDUCTION INTERNAL FIXATION, FRACTURE, Talus;  Surgeon: Chris Carter DPM;  Location:  OR     Social History     Tobacco Use     Smoking status: Never Smoker     Smokeless tobacco: Current User     Types: Chew   Substance Use Topics     Alcohol use: Yes     Comment: Alcoholic Drinks/day: socially     Current Outpatient Medications   Medication Sig Dispense Refill     acetaminophen (TYLENOL) 325 MG tablet Take 2 tablets (650 mg) by mouth every 4 hours as needed for other (mild pain) 100 tablet 0     hydrOXYzine (ATARAX) 10 MG tablet Take 1 tablet (10 mg) by mouth every 6 hours as needed for itching or anxiety (with pain, moderate pain) 30 tablet 0     ibuprofen (ADVIL/MOTRIN) 600 MG tablet Take 1 tablet (600 mg) by mouth every 6 hours as needed for pain (mild) 30 tablet 0     lisinopril (PRINIVIL/ZESTRIL) 20 MG tablet Take 1 tablet (20 mg) by mouth daily 90 tablet 3     metFORMIN (GLUCOPHAGE) 500 MG tablet Take 500 mg by mouth 2 times daily  (with meals)  2     rivaroxaban ANTICOAGULANT (XARELTO) 10 MG TABS tablet Take 1 tablet (10 mg) by mouth daily 30 tablet 0     ustekinumab (STELARA) 90 MG/ML Inject 90 mg Subcutaneous every 3 months From [facility]       venlafaxine (EFFEXOR-XR) 150 MG 24 hr capsule Take 150 mg by mouth daily       Allergies   Allergen Reactions     Penicillins Anaphylaxis     Other reaction(s): *Unknown  Other reaction(s): Hives     Amoxicillin Hives     Apremilast Other (See Comments)     depression       Review of Systems   Constitutional: Negative for fatigue.   Respiratory: Negative for shortness of breath.    Cardiovascular: Negative for chest pain.        OBJECTIVE:     /87   Pulse 68   Temp 97.6  F (36.4  C) (Tympanic)   Resp 16   There is no height or weight on file to calculate BMI.  Physical Exam   Constitutional: He is oriented to person, place, and time. He appears well-developed.   Cardiovascular: Normal rate, regular rhythm and normal heart sounds.   Pulmonary/Chest: Effort normal and breath sounds normal. No stridor. No respiratory distress. He has no wheezes.   Neurological: He is alert and oriented to person, place, and time.       Diagnostic Test Results:  none     ASSESSMENT/PLAN:       (I10) Benign essential HTN  (primary encounter diagnosis)  Comment: is now at goal  Plan: follow up yearly on this.  Every 6 months on diabetes.  Has had a liver biopsy, was fatty liver.  Discussed with him weight loss as a treatment for this as well as hypertension treatment.          Yuriy Zarate MD  [facility]

## 2019-08-16 DIAGNOSIS — Z87.81 S/P ORIF (OPEN REDUCTION INTERNAL FIXATION) FRACTURE: Primary | ICD-10-CM

## 2019-08-16 DIAGNOSIS — Z98.890 S/P ORIF (OPEN REDUCTION INTERNAL FIXATION) FRACTURE: Primary | ICD-10-CM

## 2019-08-22 ENCOUNTER — HOSPITAL ENCOUNTER (OUTPATIENT)
Dept: GENERAL RADIOLOGY | Facility: OTHER | Age: 39
Discharge: HOME | End: 2019-08-22
Attending: PODIATRIST | Admitting: PODIATRIST
Payer: COMMERCIAL

## 2019-08-22 ENCOUNTER — HOSPITAL ENCOUNTER (OUTPATIENT)
Dept: GENERAL RADIOLOGY | Facility: OTHER | Age: 39
End: 2019-08-22
Attending: PODIATRIST
Payer: COMMERCIAL

## 2019-08-22 ENCOUNTER — OFFICE VISIT (OUTPATIENT)
Dept: ORTHOPEDICS | Facility: OTHER | Age: 39
End: 2019-08-22
Attending: PODIATRIST
Payer: COMMERCIAL

## 2019-08-22 DIAGNOSIS — Z98.890 S/P ORIF (OPEN REDUCTION INTERNAL FIXATION) FRACTURE: ICD-10-CM

## 2019-08-22 DIAGNOSIS — Z87.81 S/P ORIF (OPEN REDUCTION INTERNAL FIXATION) FRACTURE: ICD-10-CM

## 2019-08-22 DIAGNOSIS — Z00.00 ROUTINE GENERAL MEDICAL EXAMINATION AT A HEALTH CARE FACILITY: Primary | ICD-10-CM

## 2019-08-22 PROCEDURE — 99024 POSTOP FOLLOW-UP VISIT: CPT | Performed by: PODIATRIST

## 2019-08-22 PROCEDURE — 73610 X-RAY EXAM OF ANKLE: CPT | Mod: LT

## 2019-08-28 NOTE — PROGRESS NOTES
CHIEF COMPLAINT: ORIF Bilateral Lateral Talar Process Fractures Postop    PROBLEMS:   Patient has no noted problems.    PATIENT REPORTED MEDICATIONS:   Patient has no noted medications.    PATIENT REPORTED ALLERGIES:  PENICILLIN (PENICILLIN V POTASSIUM) (SevereReaction: No reaction details noted)  AMOXICILLIN (AMOXICILLIN) (SevereReaction: No reaction details noted)      HISTORY OF PRESENT ILLNESS:    The patient is here two weeks out from ORIF of bilateral lateral talar process fractures.  Doing well.  No acute concerns.  He has been non-weightbearing.    PAST MEDICAL HISTORY:    Diabetes  High Blood Pressure  Arthritis    PAST ORTHOPEDIC SURGICAL HISTORY:    Open Reduction Internal Fixation Bilateral Talar Lateral Process Fractures 08/08/2019, Chris Carter, DPBECKY  Left Knee    PAST SURGICAL HISTORY:    No Past Surgical History    FAMILY HISTORY:    Family History Unknown    SOCIAL HISTORY:   Occupation:  Construction  Marital Status:  Single  Alcohol Use:  No  Tobacco Use:  Yes, Does Not Want To Quit  Secondhand Smoke Exposure:  No  History of HIV:  No  History of Hepatitis:  No    PHYSICAL EXAMINATION:    Surgical site well-coapted.  Sutures removed and Steri-Strips applied.  Minimal swelling.  CMS is intact.  No signs of infection.    X-RAY:  Three views of the bilateral ankles demonstrate intact and well-positioned hardware.  Excellent reduction of both fractures on adjacent sides.    ASSESSMENT:    Status post bilateral open reduction internal fixation talus lateral process fractures.    PLAN:   Discuss condition and progression of treatment.  I did progress him into a Reparel compression sleeve and a boot.  He can begin to do some weightbearing on these in his boot only.  Follow up in a month with repeat x-rays.    The patient received a boot undersleeve x2 () today.  The patient signed the [facility] [employer] Waiver Form.    X-RAYS NEXT VISIT:  Repeat x-rays, bilateral ankles.    Dictated by  Chris Carter DPM  cc:  Dr. Carter at [facility]    D:  08/22/2019  T:  08/26/2019    Typed and/or reviewed and corrected by signing  below, and sent to the Physician for final review and signature.    This report was created using voice recording software and computer-generated templates. Although every effort has been made to review for and eliminate errors, some errors may still occur.

## 2019-09-19 ENCOUNTER — OFFICE VISIT (OUTPATIENT)
Dept: ORTHOPEDICS | Facility: OTHER | Age: 39
End: 2019-09-19
Attending: PODIATRIST
Payer: COMMERCIAL

## 2019-09-19 ENCOUNTER — HOSPITAL ENCOUNTER (OUTPATIENT)
Dept: GENERAL RADIOLOGY | Facility: OTHER | Age: 39
End: 2019-09-19
Attending: PODIATRIST
Payer: COMMERCIAL

## 2019-09-19 ENCOUNTER — HOSPITAL ENCOUNTER (OUTPATIENT)
Dept: GENERAL RADIOLOGY | Facility: OTHER | Age: 39
Discharge: HOME | End: 2019-09-19
Attending: PODIATRIST | Admitting: PODIATRIST
Payer: COMMERCIAL

## 2019-09-19 DIAGNOSIS — Z98.890 S/P ORIF (OPEN REDUCTION INTERNAL FIXATION) FRACTURE: ICD-10-CM

## 2019-09-19 DIAGNOSIS — Z98.890 S/P ORIF (OPEN REDUCTION INTERNAL FIXATION) FRACTURE: Primary | ICD-10-CM

## 2019-09-19 DIAGNOSIS — Z87.81 S/P ORIF (OPEN REDUCTION INTERNAL FIXATION) FRACTURE: ICD-10-CM

## 2019-09-19 DIAGNOSIS — Z00.00 ROUTINE GENERAL MEDICAL EXAMINATION AT A HEALTH CARE FACILITY: Primary | ICD-10-CM

## 2019-09-19 DIAGNOSIS — Z87.81 S/P ORIF (OPEN REDUCTION INTERNAL FIXATION) FRACTURE: Primary | ICD-10-CM

## 2019-09-19 PROCEDURE — 99024 POSTOP FOLLOW-UP VISIT: CPT | Performed by: PODIATRIST

## 2019-09-19 PROCEDURE — 73610 X-RAY EXAM OF ANKLE: CPT | Mod: 50

## 2019-09-24 NOTE — PROGRESS NOTES
CHIEF COMPLAINT: ORIF Bilateral Lateral Talar Process Fractures Recheck    PROBLEMS:   Patient has no noted problems.    PATIENT REPORTED MEDICATIONS:   Patient has no noted medications.    PATIENT REPORTED ALLERGIES:  PENICILLIN (PENICILLIN V POTASSIUM) (SevereReaction: No reaction details noted)  AMOXICILLIN (AMOXICILLIN) (SevereReaction: No reaction details noted)      HISTORY OF PRESENT ILLNESS:    The patient is here six weeks out from an ORIF of the bilateral talus.  Doing well.  No acute concerns.    PAST MEDICAL HISTORY:    Diabetes  High Blood Pressure  Arthritis    PAST ORTHOPEDIC SURGICAL HISTORY:    Open Reduction Internal Fixation Bilateral Talar Lateral Process Fractures 08/08/2019, Chris Carter DPM  Left Knee    PAST SURGICAL HISTORY:    No Past Surgical History    FAMILY HISTORY:    Family History Unknown    SOCIAL HISTORY:   Occupation:  Construction  Marital Status:  Single  Alcohol Use:  No  Tobacco Use:  Yes, Does Not Want To Quit  Secondhand Smoke Exposure:  No  History of HIV:  No  History of Hepatitis:  No    PHYSICAL EXAMINATION:    Surgical site well-healed.  He is ambulatory is normal shoe gear.  CMS is intact.    X-RAY:  Three views of the bilateral feet taken and demonstrate intact and well-positioned hardware.  Good healing and reduction appreciated.    ASSESSMENT:    Status post open reduction internal fixation bilateral lateral talar process fractures.    PLAN:   Discussed progression of postoperative course.  The patient is doing well.  Progress as tolerated in terms of weightbearing.  He will get one more x-ray in six weeks and will release from there if doing well.    X-RAYS NEXT VISIT:  Bilateral feet.    Dictated by Chris Carter DPM  cc:  Dr. Carter at [facility]    D:  09/19/2019  T:  09/24/2019    Typed and/or reviewed and corrected by signing  below, and sent to the Physician for final review and signature.    This report was created using voice  recording software and computer-generated templates. Although every effort has been made to review for and eliminate errors, some errors may still occur.

## 2019-11-06 DIAGNOSIS — Z98.890 S/P ORIF (OPEN REDUCTION INTERNAL FIXATION) FRACTURE: Primary | ICD-10-CM

## 2019-11-06 DIAGNOSIS — Z87.81 S/P ORIF (OPEN REDUCTION INTERNAL FIXATION) FRACTURE: Primary | ICD-10-CM

## 2019-11-07 ENCOUNTER — HOSPITAL ENCOUNTER (OUTPATIENT)
Dept: GENERAL RADIOLOGY | Facility: OTHER | Age: 39
Discharge: HOME | End: 2019-11-07
Attending: PODIATRIST | Admitting: PODIATRIST
Payer: COMMERCIAL

## 2019-11-07 ENCOUNTER — OFFICE VISIT (OUTPATIENT)
Dept: ORTHOPEDICS | Facility: OTHER | Age: 39
End: 2019-11-07
Attending: PODIATRIST
Payer: COMMERCIAL

## 2019-11-07 ENCOUNTER — HOSPITAL ENCOUNTER (OUTPATIENT)
Dept: GENERAL RADIOLOGY | Facility: OTHER | Age: 39
End: 2019-11-07
Attending: PODIATRIST
Payer: COMMERCIAL

## 2019-11-07 DIAGNOSIS — Z98.890 S/P ORIF (OPEN REDUCTION INTERNAL FIXATION) FRACTURE: ICD-10-CM

## 2019-11-07 DIAGNOSIS — Z87.81 S/P ORIF (OPEN REDUCTION INTERNAL FIXATION) FRACTURE: ICD-10-CM

## 2019-11-07 DIAGNOSIS — Z00.00 ROUTINE GENERAL MEDICAL EXAMINATION AT A HEALTH CARE FACILITY: Primary | ICD-10-CM

## 2019-11-07 PROCEDURE — 73630 X-RAY EXAM OF FOOT: CPT | Mod: RT

## 2019-11-07 PROCEDURE — G0463 HOSPITAL OUTPT CLINIC VISIT: HCPCS | Mod: 25

## 2019-12-04 NOTE — PROGRESS NOTES
CHIEF COMPLAINT: ORIF Bilateral Lateral Talar Process Fractures Recheck    PROBLEMS:   Patient has no noted problems.    PATIENT REPORTED MEDICATIONS:   Patient has no noted medications.    PATIENT REPORTED ALLERGIES:  PENICILLIN (PENICILLIN V POTASSIUM) (SevereReaction: No reaction details noted)  AMOXICILLIN (AMOXICILLIN) (SevereReaction: No reaction details noted)      HISTORY OF PRESENT ILLNESS:    The patient is a 38-year-old male who is status post open reduction internal fixation of bilateral talar lateral process fractures on 08/08/2019 by Dr. Carter.  He states that he is doing quite well.  On the right side he feels like he is about 90% back to normal, but he is still experiencing some residual pain on the left side.  He has been back to work and he is walking with a normal gait today.    PAST MEDICAL HISTORY:    Diabetes  High Blood Pressure  Arthritis    PAST ORTHOPEDIC SURGICAL HISTORY:    Open Reduction Internal Fixation Bilateral Talar Lateral Process Fractures 08/08/2019, Chris Carter, DPBECKY  Left Knee    PAST SURGICAL HISTORY:    No Past Surgical History    FAMILY HISTORY:    Family History Unknown    SOCIAL HISTORY:   Occupation:  Construction  Marital Status:  Single  Alcohol Use:  No  Tobacco Use:  Yes, Does Not Want To Quit  Secondhand Smoke Exposure:  No  History of HIV:  No  History of Hepatitis:  No    PHYSICAL EXAMINATION:    Sb comes in today.  He denies any numbness or tingling.  No bilateral calf pain at all.  He has full range of motion of his ankle.  The incisions are clean and dry.  There are no signs of infection.  Minimal swelling.    X-RAY:  X-rays today, three views of the bilateral feet weightbearing, show well-fixed and well-maintained screws.  No signs of loosening.    ASSESSMENT:    Status post open reduction internal fixation of bilateral lateral talar process fractures, more right than left, doing fairly well.    PLAN:   The plan today is to have him continue his normal  activities.  If his left side is still giving him trouble he can come back in six weeks.  At that time we can consider a cortisone injection on the left side to see if that gives him good relief.    Dictated by Juventino Negron PA-C  cc:  Dr. Carter at [facility]    D:  11/07/2019  T:  11/13/2019    Typed and/or reviewed and corrected by signing  below, and sent to the Physician for final review and signature.    This report was created using voice recording software and computer-generated templates. Although every effort has been made to review for and eliminate errors, some errors may still occur.

## 2020-01-02 ENCOUNTER — OFFICE VISIT (OUTPATIENT)
Dept: ORTHOPEDICS | Facility: OTHER | Age: 40
End: 2020-01-02
Attending: PODIATRIST
Payer: COMMERCIAL

## 2020-01-02 DIAGNOSIS — Z00.00 ROUTINE GENERAL MEDICAL EXAMINATION AT A HEALTH CARE FACILITY: Primary | ICD-10-CM

## 2020-01-02 PROCEDURE — G0463 HOSPITAL OUTPT CLINIC VISIT: HCPCS

## 2020-01-06 NOTE — PROGRESS NOTES
CHIEF COMPLAINT: ORIF Bilateral Talar Process Fracture Recheck    PROBLEMS:   Patient has no noted problems.    PATIENT REPORTED MEDICATIONS:   Patient has no noted medications.    PATIENT REPORTED ALLERGIES:  PENICILLIN (PENICILLIN V POTASSIUM) (SevereReaction: No reaction details noted)  AMOXICILLIN (AMOXICILLIN) (SevereReaction: No reaction details noted)      HISTORY OF PRESENT ILLNESS:    The patient is five months out from ORIF of the bilateral talus.  He has been doing well.  He has an os trigonum or posterior talar process fracture as well which is causing pain on his left side.  This was not fixed or removed.  He would like a cortisone injection today for this.  Procedure only.    PAST MEDICAL HISTORY:    Diabetes  High Blood Pressure  Arthritis    PAST ORTHOPEDIC SURGICAL HISTORY:    Open Reduction Internal Fixation Bilateral Talar Lateral Process Fractures 08/08/2019, Chris Carter DPM  Left Knee    PAST SURGICAL HISTORY:    No Past Surgical History    FAMILY HISTORY:    Family History Unknown    SOCIAL HISTORY:   Occupation:  Construction  Marital Status:  Single  Alcohol Use:  No  Tobacco Use:  Yes, Does Not Want To Quit  Secondhand Smoke Exposure:  No  History of HIV:  No  History of Hepatitis:  No    PHYSICAL EXAMINATION:    Not performed, procedure only.    ASSESSMENT:    Status post bilateral open reduction internal fixation talus lateral process fractures.    PROCEDURES:   Informed consent was obtained and risks and benefits discussed.  Blood sugar risks for our diabetic patients were also discussed.  The patient's left ankle was prepped from a medial portal with alcohol and Betadine solution.  I injected through this medial portal 20 mg of Kenalog and 1 cc of 1% lidocaine. A sterile bandage was applied.  The patient tolerated this procedure well.    Dictated by Chris Carter DPM  cc:  Dr. Carter at [facility]    D:  01/02/2020  T:  01/06/2020    Typed and/or reviewed and corrected by  signing  below, and sent to the Physician for final review and signature.    This report was created using voice recording software and computer-generated templates. Although every effort has been made to review for and eliminate errors, some errors may still occur.

## 2020-02-13 ENCOUNTER — OFFICE VISIT (OUTPATIENT)
Dept: ORTHOPEDICS | Facility: OTHER | Age: 40
End: 2020-02-13
Attending: PODIATRIST
Payer: COMMERCIAL

## 2020-02-13 DIAGNOSIS — G89.29 CHRONIC PAIN OF LEFT ANKLE: Primary | ICD-10-CM

## 2020-02-13 DIAGNOSIS — M25.572 CHRONIC PAIN OF LEFT ANKLE: Primary | ICD-10-CM

## 2020-02-13 PROCEDURE — G0463 HOSPITAL OUTPT CLINIC VISIT: HCPCS

## 2020-02-14 ENCOUNTER — HOSPITAL ENCOUNTER (OUTPATIENT)
Dept: MRI IMAGING | Facility: OTHER | Age: 40
Discharge: HOME | End: 2020-02-14
Attending: PODIATRIST | Admitting: PODIATRIST
Payer: COMMERCIAL

## 2020-02-14 DIAGNOSIS — G89.29 CHRONIC PAIN OF LEFT ANKLE: ICD-10-CM

## 2020-02-14 DIAGNOSIS — M25.572 CHRONIC PAIN OF LEFT ANKLE: ICD-10-CM

## 2020-02-14 PROCEDURE — 73721 MRI JNT OF LWR EXTRE W/O DYE: CPT | Mod: LT

## 2020-02-20 NOTE — PROGRESS NOTES
CHIEF COMPLAINT: ORIF Bilateral Talar Process Fracture Recheck, Pain Left Side    PROBLEMS:   Patient has no noted problems.    PATIENT REPORTED MEDICATIONS:   Patient has no noted medications.    PATIENT REPORTED ALLERGIES:  PENICILLIN (PENICILLIN V POTASSIUM) (SevereReaction: No reaction details noted)  AMOXICILLIN (AMOXICILLIN) (SevereReaction: No reaction details noted)      HISTORY OF PRESENT ILLNESS:    The patient is six and a half months out from ORIF bilateral lateral talar process fractures.  His right side is doing quite well.  On the left side he has nagging pain.  We did an injection in the ankle and it helped for two to three weeks only.  Recall he has a fractured posterior talar process or os trigonum which may be causing some of his symptoms.  He may have some subtalar joint arthritis.  He is here to have this evaluated and to discuss options.    PAST MEDICAL HISTORY:    Diabetes  High Blood Pressure  Arthritis    PAST ORTHOPEDIC SURGICAL HISTORY:    Open Reduction Internal Fixation Bilateral Talar Lateral Process Fractures 08/08/2019, Chris Carter, DPM  Left Knee    PAST SURGICAL HISTORY:    No Past Surgical History    FAMILY HISTORY:    Family History Unknown    SOCIAL HISTORY:   Occupation:  Construction  Marital Status:  Single  Alcohol Use:  No  Tobacco Use:  Yes, Does Not Want To Quit  Secondhand Smoke Exposure:  No  History of HIV:  No  History of Hepatitis:  No    PHYSICAL EXAMINATION:    CONSTITUTIONAL:  Patient is alert and oriented x3, well-appearing and in no apparent distress.  Affect is pleasant and answers questions appropriately.  VASCULAR:  Circulation is intact with palpable pedal pulses and has adequate capillary fill time.  NEUROLOGIC:  Light touch sensation is intact to digits.  INTEGUMENT:  No dermatologic lesions are noted.  Skin with normal texture and turgor.  MUSCULOSKELETAL:   Ankle stability seems to be intact.  He does have some diffuse symptoms.  I am unable to pinpoint  the exact location.  It hurts more with activity.  No obvious symptoms with end range of plantar flexion or with stress of the hallux with the ankle loaded.  He did improve with a diagnostic injection.    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    PLAN:   Discussed condition and treatment with the patient today.  At this point we will plan on a left ankle MRI to work up the subtalar joint and os trigonum.  I will actually give him a call with the results and discuss options.    Dictated by Chris Carter DPM  cc:  Dr. Carter at [facility]    D:  02/13/2020  T:  02/19/2020    Typed and/or reviewed and corrected by signing  below, and sent to the Physician for final review and signature.    This report was created using voice recording software and computer-generated templates. Although every effort has been made to review for and eliminate errors, some errors may still occur.

## 2020-03-02 ENCOUNTER — TELEPHONE (OUTPATIENT)
Dept: FAMILY MEDICINE | Facility: OTHER | Age: 40
End: 2020-03-02

## 2020-03-02 RX ORDER — MULTIPLE VITAMINS W/ MINERALS TAB 9MG-400MCG
1 TAB ORAL DAILY
COMMUNITY
End: 2022-04-01

## 2020-03-02 RX ORDER — OMEGA-3 FATTY ACIDS/FISH OIL 300-1000MG
CAPSULE ORAL DAILY
COMMUNITY
End: 2022-04-01

## 2020-03-02 RX ORDER — VENLAFAXINE HYDROCHLORIDE 75 MG/1
CAPSULE, EXTENDED RELEASE ORAL
COMMUNITY
Start: 2020-02-11

## 2020-03-03 ENCOUNTER — OFFICE VISIT (OUTPATIENT)
Dept: FAMILY MEDICINE | Facility: OTHER | Age: 40
End: 2020-03-03
Attending: FAMILY MEDICINE
Payer: COMMERCIAL

## 2020-03-03 VITALS
BODY MASS INDEX: 34.75 KG/M2 | HEART RATE: 86 BPM | SYSTOLIC BLOOD PRESSURE: 148 MMHG | RESPIRATION RATE: 16 BRPM | OXYGEN SATURATION: 97 % | DIASTOLIC BLOOD PRESSURE: 78 MMHG | TEMPERATURE: 99.1 F | WEIGHT: 256.6 LBS | HEIGHT: 72 IN

## 2020-03-03 DIAGNOSIS — M25.572 PAIN IN JOINT, ANKLE AND FOOT, LEFT: ICD-10-CM

## 2020-03-03 DIAGNOSIS — Z01.818 PREOP GENERAL PHYSICAL EXAM: ICD-10-CM

## 2020-03-03 DIAGNOSIS — E11.9 CONTROLLED TYPE 2 DIABETES MELLITUS WITHOUT COMPLICATION, WITHOUT LONG-TERM CURRENT USE OF INSULIN (H): Primary | ICD-10-CM

## 2020-03-03 DIAGNOSIS — I10 BENIGN ESSENTIAL HTN: ICD-10-CM

## 2020-03-03 PROBLEM — G89.18 POST-OPERATIVE PAIN: Status: RESOLVED | Noted: 2019-08-08 | Resolved: 2020-03-03

## 2020-03-03 LAB
ANION GAP SERPL CALCULATED.3IONS-SCNC: 9 MMOL/L (ref 3–14)
BUN SERPL-MCNC: 13 MG/DL (ref 7–25)
CALCIUM SERPL-MCNC: 9.9 MG/DL (ref 8.6–10.3)
CHLORIDE SERPL-SCNC: 103 MMOL/L (ref 98–107)
CO2 SERPL-SCNC: 25 MMOL/L (ref 21–31)
CREAT SERPL-MCNC: 1.16 MG/DL (ref 0.7–1.3)
GFR SERPL CREATININE-BSD FRML MDRD: 69 ML/MIN/{1.73_M2}
GLUCOSE SERPL-MCNC: 124 MG/DL (ref 70–105)
HBA1C MFR BLD: 6.1 % (ref 4–6)
POTASSIUM SERPL-SCNC: 3.9 MMOL/L (ref 3.5–5.1)
SODIUM SERPL-SCNC: 137 MMOL/L (ref 134–144)

## 2020-03-03 PROCEDURE — 36415 COLL VENOUS BLD VENIPUNCTURE: CPT | Mod: ZL | Performed by: FAMILY MEDICINE

## 2020-03-03 PROCEDURE — 83036 HEMOGLOBIN GLYCOSYLATED A1C: CPT | Mod: ZL | Performed by: FAMILY MEDICINE

## 2020-03-03 PROCEDURE — 99214 OFFICE O/P EST MOD 30 MIN: CPT | Performed by: FAMILY MEDICINE

## 2020-03-03 PROCEDURE — 80048 BASIC METABOLIC PNL TOTAL CA: CPT | Mod: ZL | Performed by: FAMILY MEDICINE

## 2020-03-03 ASSESSMENT — PAIN SCALES - GENERAL: PAINLEVEL: MODERATE PAIN (5)

## 2020-03-03 ASSESSMENT — PATIENT HEALTH QUESTIONNAIRE - PHQ9: SUM OF ALL RESPONSES TO PHQ QUESTIONS 1-9: 0

## 2020-03-03 ASSESSMENT — MIFFLIN-ST. JEOR: SCORE: 2106.93

## 2020-03-03 NOTE — LETTER
March 5, 2020      Sb ROD Geovani  [address] [place] [address]        Dear ,    We are writing to inform you of your test results.    Your test results fall within the expected range(s) or remain unchanged from previous results.  Please continue with current treatment plan.    Resulted Orders   Basic Metabolic Panel   Result Value Ref Range    Sodium 137 134 - 144 mmol/L    Potassium 3.9 3.5 - 5.1 mmol/L    Chloride 103 98 - 107 mmol/L    Carbon Dioxide 25 21 - 31 mmol/L    Anion Gap 9 3 - 14 mmol/L    Glucose 124 (H) 70 - 105 mg/dL    Urea Nitrogen 13 7 - 25 mg/dL    Creatinine 1.16 0.70 - 1.30 mg/dL    GFR Estimate 69 >60 mL/min/[1.73_m2]    GFR Estimate If Black 84 >60 mL/min/[1.73_m2]    Calcium 9.9 8.6 - 10.3 mg/dL   Hemoglobin A1c   Result Value Ref Range    Hemoglobin A1C 6.1 (H) 4.0 - 6.0 %       If you have any questions or concerns, please call the clinic at the number listed above.       Sincerely,        Tony Mathias MD
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
## 2020-03-03 NOTE — NURSING NOTE
"Date of Surgery: 03/05/2020   Type of Surgery: Left ankle   Surgeon:   [facility]:  [facility]  Fax:     Fever/Chills or other infectious symptoms in past month: no  >10lb weight loss in past two months: yes  O2 SAT: 97    Health Care Directive/Code status:  no  Hx of blood transfusions:   no  Td up to date:  yes  History of VRE/MRSA:  no Date:     Preoperative Evaluation: Obstructive Sleep Apnea screening    S: Snore -  Do you snore loudly? (louder than talking or loud enough to be heard through closed doors)yes CPAP   T: Tired - Do you often feel tired, fatigued, or sleepy during the daytime?no  O: Observed - Has anyone ever observed you stop breathing during your sleep?yes  P: Pressure - Do you have or are you being treated for high blood pressure?yes  B: BMI - BMI greater than 35kg/m2?no  A: Age - Age over 50 years old?no  N: Neck - Neck circumference greater than 40 cm?no  G: Gender - Gender: Male?yes    Total number of \"YES\" responses:  4    Scoring: Low risk of ZAHEER 0-2  At Risk of ZAHEER: >3 High Risk of ZAHEER: 5-8    Milagro Farmer LPN 3/3/2020 1:11 PM  Medication Reconciliation: complete.    Milagro Farmer LPN  3/3/2020 1:11 PM  "
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
## 2020-03-04 ENCOUNTER — ANESTHESIA EVENT (OUTPATIENT)
Dept: SURGERY | Facility: OTHER | Age: 40
End: 2020-03-04
Payer: COMMERCIAL

## 2020-03-05 ENCOUNTER — ANESTHESIA (OUTPATIENT)
Dept: SURGERY | Facility: OTHER | Age: 40
End: 2020-03-05
Payer: COMMERCIAL

## 2020-03-05 ENCOUNTER — HOSPITAL ENCOUNTER (OUTPATIENT)
Facility: OTHER | Age: 40
Discharge: HOME | End: 2020-03-05
Attending: PODIATRIST | Admitting: PODIATRIST
Payer: COMMERCIAL

## 2020-03-05 VITALS
TEMPERATURE: 97.9 F | DIASTOLIC BLOOD PRESSURE: 70 MMHG | RESPIRATION RATE: 10 BRPM | OXYGEN SATURATION: 93 % | HEART RATE: 69 BPM | SYSTOLIC BLOOD PRESSURE: 118 MMHG

## 2020-03-05 DIAGNOSIS — G89.18 POST-OP PAIN: Primary | ICD-10-CM

## 2020-03-05 PROCEDURE — 27210794 ZZH OR GENERAL SUPPLY STERILE: Performed by: PODIATRIST

## 2020-03-05 PROCEDURE — 29895 ANKLE ARTHROSCOPY/SURGERY: CPT | Mod: XU | Performed by: PODIATRIST

## 2020-03-05 PROCEDURE — 37000009 ZZH ANESTHESIA TECHNICAL FEE, EACH ADDTL 15 MIN: Performed by: PODIATRIST

## 2020-03-05 PROCEDURE — 36000056 ZZH SURGERY LEVEL 3 1ST 30 MIN: Performed by: PODIATRIST

## 2020-03-05 PROCEDURE — 71000014 ZZH RECOVERY PHASE 1 LEVEL 2 FIRST HR: Performed by: PODIATRIST

## 2020-03-05 PROCEDURE — 25000125 ZZHC RX 250: Performed by: NURSE ANESTHETIST, CERTIFIED REGISTERED

## 2020-03-05 PROCEDURE — 71000027 ZZH RECOVERY PHASE 2 EACH 15 MINS: Performed by: PODIATRIST

## 2020-03-05 PROCEDURE — 29898 ANKLE ARTHROSCOPY/SURGERY: CPT | Performed by: NURSE ANESTHETIST, CERTIFIED REGISTERED

## 2020-03-05 PROCEDURE — 25800030 ZZH RX IP 258 OP 636: Performed by: NURSE ANESTHETIST, CERTIFIED REGISTERED

## 2020-03-05 PROCEDURE — 37000008 ZZH ANESTHESIA TECHNICAL FEE, 1ST 30 MIN: Performed by: PODIATRIST

## 2020-03-05 PROCEDURE — 29898 ANKLE ARTHROSCOPY/SURGERY: CPT | Performed by: PODIATRIST

## 2020-03-05 PROCEDURE — 36000058 ZZH SURGERY LEVEL 3 EA 15 ADDTL MIN: Performed by: PODIATRIST

## 2020-03-05 PROCEDURE — 25000125 ZZHC RX 250: Performed by: PODIATRIST

## 2020-03-05 PROCEDURE — 25800025 ZZH RX 258: Performed by: PODIATRIST

## 2020-03-05 PROCEDURE — 40000306 ZZH STATISTIC PRE PROC ASSESS II: Performed by: PODIATRIST

## 2020-03-05 PROCEDURE — 25000128 H RX IP 250 OP 636: Performed by: NURSE ANESTHETIST, CERTIFIED REGISTERED

## 2020-03-05 PROCEDURE — 25000564 ZZH DESFLURANE, EA 15 MIN: Performed by: PODIATRIST

## 2020-03-05 PROCEDURE — 25000128 H RX IP 250 OP 636: Performed by: PODIATRIST

## 2020-03-05 PROCEDURE — 27211024 ZZHC OR SUPPLY OTHER OPNP: Performed by: PODIATRIST

## 2020-03-05 RX ORDER — LIDOCAINE HYDROCHLORIDE 20 MG/ML
INJECTION, SOLUTION INFILTRATION; PERINEURAL PRN
Status: DISCONTINUED | OUTPATIENT
Start: 2020-03-05 | End: 2020-03-05

## 2020-03-05 RX ORDER — HYDROXYZINE HYDROCHLORIDE 25 MG/1
25 TABLET, FILM COATED ORAL EVERY 6 HOURS PRN
Qty: 20 TABLET | Refills: 0 | Status: SHIPPED | OUTPATIENT
Start: 2020-03-05 | End: 2022-04-01

## 2020-03-05 RX ORDER — FLUMAZENIL 0.1 MG/ML
0.2 INJECTION, SOLUTION INTRAVENOUS
Status: DISCONTINUED | OUTPATIENT
Start: 2020-03-05 | End: 2020-03-05 | Stop reason: HOSPADM

## 2020-03-05 RX ORDER — EPHEDRINE SULFATE 50 MG/ML
INJECTION, SOLUTION INTRAVENOUS PRN
Status: DISCONTINUED | OUTPATIENT
Start: 2020-03-05 | End: 2020-03-05

## 2020-03-05 RX ORDER — NEOSTIGMINE METHYLSULFATE 1 MG/ML
VIAL (ML) INJECTION PRN
Status: DISCONTINUED | OUTPATIENT
Start: 2020-03-05 | End: 2020-03-05

## 2020-03-05 RX ORDER — CLINDAMYCIN PHOSPHATE 900 MG/50ML
900 INJECTION, SOLUTION INTRAVENOUS SEE ADMIN INSTRUCTIONS
Status: DISCONTINUED | OUTPATIENT
Start: 2020-03-05 | End: 2020-03-05 | Stop reason: HOSPADM

## 2020-03-05 RX ORDER — OXYCODONE HYDROCHLORIDE 5 MG/1
10 TABLET ORAL
Status: DISCONTINUED | OUTPATIENT
Start: 2020-03-05 | End: 2020-03-05 | Stop reason: HOSPADM

## 2020-03-05 RX ORDER — HYDROXYZINE HYDROCHLORIDE 10 MG/1
20 TABLET, FILM COATED ORAL
Status: DISCONTINUED | OUTPATIENT
Start: 2020-03-05 | End: 2020-03-05 | Stop reason: HOSPADM

## 2020-03-05 RX ORDER — DEXAMETHASONE SODIUM PHOSPHATE 10 MG/ML
INJECTION, SOLUTION INTRAMUSCULAR; INTRAVENOUS PRN
Status: DISCONTINUED | OUTPATIENT
Start: 2020-03-05 | End: 2020-03-05

## 2020-03-05 RX ORDER — ONDANSETRON 4 MG/1
4 TABLET, ORALLY DISINTEGRATING ORAL
Status: DISCONTINUED | OUTPATIENT
Start: 2020-03-05 | End: 2020-03-05 | Stop reason: HOSPADM

## 2020-03-05 RX ORDER — ONDANSETRON 4 MG/1
4 TABLET, ORALLY DISINTEGRATING ORAL EVERY 30 MIN PRN
Status: DISCONTINUED | OUTPATIENT
Start: 2020-03-05 | End: 2020-03-05 | Stop reason: HOSPADM

## 2020-03-05 RX ORDER — NALOXONE HYDROCHLORIDE 0.4 MG/ML
.1-.4 INJECTION, SOLUTION INTRAMUSCULAR; INTRAVENOUS; SUBCUTANEOUS
Status: DISCONTINUED | OUTPATIENT
Start: 2020-03-05 | End: 2020-03-05 | Stop reason: HOSPADM

## 2020-03-05 RX ORDER — ACETAMINOPHEN 325 MG/1
650 TABLET ORAL
Status: DISCONTINUED | OUTPATIENT
Start: 2020-03-05 | End: 2020-03-05 | Stop reason: HOSPADM

## 2020-03-05 RX ORDER — LIDOCAINE 40 MG/G
CREAM TOPICAL
Status: DISCONTINUED | OUTPATIENT
Start: 2020-03-05 | End: 2020-03-05 | Stop reason: HOSPADM

## 2020-03-05 RX ORDER — SODIUM CHLORIDE, SODIUM LACTATE, POTASSIUM CHLORIDE, CALCIUM CHLORIDE 600; 310; 30; 20 MG/100ML; MG/100ML; MG/100ML; MG/100ML
INJECTION, SOLUTION INTRAVENOUS CONTINUOUS
Status: DISCONTINUED | OUTPATIENT
Start: 2020-03-05 | End: 2020-03-05 | Stop reason: HOSPADM

## 2020-03-05 RX ORDER — CLINDAMYCIN PHOSPHATE 900 MG/50ML
900 INJECTION, SOLUTION INTRAVENOUS
Status: DISCONTINUED | OUTPATIENT
Start: 2020-03-05 | End: 2020-03-05 | Stop reason: HOSPADM

## 2020-03-05 RX ORDER — FENTANYL CITRATE 50 UG/ML
INJECTION, SOLUTION INTRAMUSCULAR; INTRAVENOUS PRN
Status: DISCONTINUED | OUTPATIENT
Start: 2020-03-05 | End: 2020-03-05

## 2020-03-05 RX ORDER — ONDANSETRON 4 MG/1
4-8 TABLET, ORALLY DISINTEGRATING ORAL EVERY 8 HOURS PRN
Qty: 4 TABLET | Refills: 0 | Status: SHIPPED | OUTPATIENT
Start: 2020-03-05 | End: 2022-04-01

## 2020-03-05 RX ORDER — PROPOFOL 10 MG/ML
INJECTION, EMULSION INTRAVENOUS CONTINUOUS PRN
Status: DISCONTINUED | OUTPATIENT
Start: 2020-03-05 | End: 2020-03-05

## 2020-03-05 RX ORDER — GLYCOPYRROLATE 0.2 MG/ML
INJECTION, SOLUTION INTRAMUSCULAR; INTRAVENOUS PRN
Status: DISCONTINUED | OUTPATIENT
Start: 2020-03-05 | End: 2020-03-05

## 2020-03-05 RX ORDER — FENTANYL CITRATE 50 UG/ML
25-50 INJECTION, SOLUTION INTRAMUSCULAR; INTRAVENOUS
Status: DISCONTINUED | OUTPATIENT
Start: 2020-03-05 | End: 2020-03-05 | Stop reason: HOSPADM

## 2020-03-05 RX ORDER — ONDANSETRON 2 MG/ML
INJECTION INTRAMUSCULAR; INTRAVENOUS PRN
Status: DISCONTINUED | OUTPATIENT
Start: 2020-03-05 | End: 2020-03-05

## 2020-03-05 RX ORDER — PROPOFOL 10 MG/ML
INJECTION, EMULSION INTRAVENOUS PRN
Status: DISCONTINUED | OUTPATIENT
Start: 2020-03-05 | End: 2020-03-05

## 2020-03-05 RX ORDER — ONDANSETRON 2 MG/ML
4 INJECTION INTRAMUSCULAR; INTRAVENOUS EVERY 30 MIN PRN
Status: DISCONTINUED | OUTPATIENT
Start: 2020-03-05 | End: 2020-03-05 | Stop reason: HOSPADM

## 2020-03-05 RX ORDER — BUPIVACAINE HYDROCHLORIDE 5 MG/ML
INJECTION, SOLUTION EPIDURAL; INTRACAUDAL PRN
Status: DISCONTINUED | OUTPATIENT
Start: 2020-03-05 | End: 2020-03-05

## 2020-03-05 RX ORDER — ACETAMINOPHEN 500 MG
1000 TABLET ORAL EVERY 8 HOURS
Qty: 30 TABLET | Refills: 0 | Status: SHIPPED | OUTPATIENT
Start: 2020-03-05 | End: 2020-03-10

## 2020-03-05 RX ORDER — OXYCODONE HYDROCHLORIDE 5 MG/1
5-10 TABLET ORAL EVERY 4 HOURS PRN
Qty: 16 TABLET | Refills: 0 | Status: SHIPPED | OUTPATIENT
Start: 2020-03-05 | End: 2022-04-01

## 2020-03-05 RX ORDER — KETAMINE HYDROCHLORIDE 50 MG/ML
INJECTION, SOLUTION INTRAMUSCULAR; INTRAVENOUS PRN
Status: DISCONTINUED | OUTPATIENT
Start: 2020-03-05 | End: 2020-03-05

## 2020-03-05 RX ORDER — KETOROLAC TROMETHAMINE 30 MG/ML
INJECTION, SOLUTION INTRAMUSCULAR; INTRAVENOUS PRN
Status: DISCONTINUED | OUTPATIENT
Start: 2020-03-05 | End: 2020-03-05

## 2020-03-05 RX ORDER — DEXAMETHASONE SODIUM PHOSPHATE 4 MG/ML
INJECTION, SOLUTION INTRA-ARTICULAR; INTRALESIONAL; INTRAMUSCULAR; INTRAVENOUS; SOFT TISSUE PRN
Status: DISCONTINUED | OUTPATIENT
Start: 2020-03-05 | End: 2020-03-05

## 2020-03-05 RX ADMIN — BUPIVACAINE HYDROCHLORIDE 20 ML: 5 INJECTION, SOLUTION EPIDURAL; INTRACAUDAL; PERINEURAL at 09:58

## 2020-03-05 RX ADMIN — DEXAMETHASONE SODIUM PHOSPHATE 2 MG: 10 INJECTION, SOLUTION INTRAMUSCULAR; INTRAVENOUS at 10:07

## 2020-03-05 RX ADMIN — SODIUM CHLORIDE, POTASSIUM CHLORIDE, SODIUM LACTATE AND CALCIUM CHLORIDE: 600; 310; 30; 20 INJECTION, SOLUTION INTRAVENOUS at 11:30

## 2020-03-05 RX ADMIN — MIDAZOLAM 2 MG: 1 INJECTION INTRAMUSCULAR; INTRAVENOUS at 10:40

## 2020-03-05 RX ADMIN — ROCURONIUM BROMIDE 50 MG: 10 INJECTION INTRAVENOUS at 10:42

## 2020-03-05 RX ADMIN — PHENYLEPHRINE HYDROCHLORIDE 100 MCG: 10 INJECTION INTRAVENOUS at 11:21

## 2020-03-05 RX ADMIN — EPHEDRINE SULFATE 10 MG: 50 INJECTION, SOLUTION INTRAVENOUS at 11:39

## 2020-03-05 RX ADMIN — PROCHLORPERAZINE EDISYLATE 10 MG: 5 INJECTION INTRAMUSCULAR; INTRAVENOUS at 12:28

## 2020-03-05 RX ADMIN — PHENYLEPHRINE HYDROCHLORIDE 100 MCG: 10 INJECTION INTRAVENOUS at 11:16

## 2020-03-05 RX ADMIN — ONDANSETRON 4 MG: 2 INJECTION INTRAMUSCULAR; INTRAVENOUS at 10:42

## 2020-03-05 RX ADMIN — KETOROLAC TROMETHAMINE 30 MG: 30 INJECTION, SOLUTION INTRAMUSCULAR at 11:50

## 2020-03-05 RX ADMIN — GLYCOPYRROLATE 1 MG: 0.2 INJECTION, SOLUTION INTRAMUSCULAR; INTRAVENOUS at 11:49

## 2020-03-05 RX ADMIN — PROPOFOL 100 MCG/KG/MIN: 10 INJECTION, EMULSION INTRAVENOUS at 10:49

## 2020-03-05 RX ADMIN — LIDOCAINE HYDROCHLORIDE 0.1 ML: 10 INJECTION, SOLUTION EPIDURAL; INFILTRATION; INTRACAUDAL; PERINEURAL at 09:30

## 2020-03-05 RX ADMIN — DEXAMETHASONE SODIUM PHOSPHATE 2 MG: 10 INJECTION, SOLUTION INTRAMUSCULAR; INTRAVENOUS at 09:58

## 2020-03-05 RX ADMIN — SODIUM CHLORIDE, POTASSIUM CHLORIDE, SODIUM LACTATE AND CALCIUM CHLORIDE 100 ML/HR: 600; 310; 30; 20 INJECTION, SOLUTION INTRAVENOUS at 09:30

## 2020-03-05 RX ADMIN — DEXAMETHASONE SODIUM PHOSPHATE 4 MG: 4 INJECTION, SOLUTION INTRA-ARTICULAR; INTRALESIONAL; INTRAMUSCULAR; INTRAVENOUS; SOFT TISSUE at 10:50

## 2020-03-05 RX ADMIN — KETAMINE HYDROCHLORIDE 30 MG: 50 INJECTION, SOLUTION INTRAMUSCULAR; INTRAVENOUS at 10:40

## 2020-03-05 RX ADMIN — MIDAZOLAM 2 MG: 1 INJECTION INTRAMUSCULAR; INTRAVENOUS at 09:46

## 2020-03-05 RX ADMIN — ONDANSETRON HYDROCHLORIDE 4 MG: 2 INJECTION, SOLUTION INTRAMUSCULAR; INTRAVENOUS at 12:21

## 2020-03-05 RX ADMIN — PHENYLEPHRINE HYDROCHLORIDE 200 MCG: 10 INJECTION INTRAVENOUS at 11:30

## 2020-03-05 RX ADMIN — CLINDAMYCIN PHOSPHATE 900 MG: 900 INJECTION, SOLUTION INTRAVENOUS at 10:38

## 2020-03-05 RX ADMIN — SODIUM CHLORIDE, POTASSIUM CHLORIDE, SODIUM LACTATE AND CALCIUM CHLORIDE: 600; 310; 30; 20 INJECTION, SOLUTION INTRAVENOUS at 12:38

## 2020-03-05 RX ADMIN — NEOSTIGMINE METHYLSULFATE 5 MG: 1 INJECTION INTRAVENOUS at 11:49

## 2020-03-05 RX ADMIN — PHENYLEPHRINE HYDROCHLORIDE 100 MCG: 10 INJECTION INTRAVENOUS at 11:33

## 2020-03-05 RX ADMIN — PROPOFOL 200 MG: 10 INJECTION, EMULSION INTRAVENOUS at 10:42

## 2020-03-05 RX ADMIN — LIDOCAINE HYDROCHLORIDE 60 MG: 20 INJECTION, SOLUTION INFILTRATION; PERINEURAL at 10:42

## 2020-03-05 RX ADMIN — BUPIVACAINE HYDROCHLORIDE 20 ML: 5 INJECTION, SOLUTION EPIDURAL; INTRACAUDAL; PERINEURAL at 10:07

## 2020-03-05 RX ADMIN — FENTANYL CITRATE 50 MCG: 50 INJECTION, SOLUTION INTRAMUSCULAR; INTRAVENOUS at 10:40

## 2020-03-05 NOTE — ANESTHESIA CARE TRANSFER NOTE
Patient: Sb Olivo    Procedure(s):  Posterior Ankle Scope, extensive debridement,Ostrigoneum removal, FHL peroneal Tendon debridement    Diagnosis: Ankle pain [M25.579]  Diagnosis Additional Information: No value filed.    Anesthesia Type:   General, Peripheral Nerve Block, For Post-op pain in coordination with surgeon, ETT     Note:  Airway :Face Mask  Patient transferred to:PACU  Handoff Report: Identifed the Patient, Identified the Reponsible Provider, Reviewed the pertinent medical history, Discussed the surgical course, Reviewed Intra-OP anesthesia mangement and issues during anesthesia, Set expectations for post-procedure period and Allowed opportunity for questions and acknowledgement of understanding      Vitals: (Last set prior to Anesthesia Care Transfer)    CRNA VITALS  3/5/2020 1136 - 3/5/2020 1209      3/5/2020             Pulse:  134    Ht Rate:  134    SpO2:  93 %    Resp Rate (observed):  9    Resp Rate (set):  10                Electronically Signed By: ALEX DOHERTY CRNA  March 5, 2020  12:09 PM

## 2020-03-05 NOTE — BRIEF OP NOTE
[facility]    Brief Operative Note    Pre-operative diagnosis: Ankle pain [M25.579]  Post-operative diagnosis posterior talar process fracture, FHL tendinosis,     Procedure: Procedure(s):  Posterior Ankle Scope, Ostrigoneum Resection, FHL peroneal Tenosynovectomy  Surgeon: Surgeon(s) and Role:     * Chris Carter DPM - Primary     * Juventino Negron PA - Assisting  Anesthesia: Combined General with Block   Estimated blood loss: Minimal  Drains: None  Specimens: * No specimens in log *  Findings:   None.  Complications: None.  Implants: * No implants in log *

## 2020-03-05 NOTE — PROGRESS NOTES
PACU Respiratory Event Documentation     1) Episodes of Apnea greater than or equal to 10 seconds: no    2) Bradypnea - less than 8 breaths per minute: no    3) Pain score on 0 to 10 scale: 4    4) Pain-sedation mismatch (yes or no): no    5) Repeated 02 desaturation less than 90% (yes or no): no    Anesthesia notified? (yes or no): yes nausea and headache ordered iv tylenol but patient is better now and just wants coffee    Any of the above events occuring repeatedly in separate 30 minute intervals may be considered recurrent PACU respiratory events.

## 2020-03-05 NOTE — ANESTHESIA PROCEDURE NOTES
Airway   Date/Time: 3/5/2020 10:45 AM   Patient location during procedure: OR    General Information and Staff   Resident/CRNA: Anastasia Ortega APRN CRNA  Performed: CRNA     Consent for Airway   Urgency: elective        Indications and Patient Condition  Indications for airway management: gurwinder-procedural and airway protection  Induction type:intravenous  Mask difficulty assessment: easy with oral airway    Final Airway Details  Final airway type: endotracheal airway  Successful airway:ETT  ETT size (mm): 8.0 Cuffed: yes   Blade: [place]  Blade size: #2  Successful intubation technique: asleep and direct  Endotracheal tube insertion site: right side of mouth   Measured from: teeth  ETT to teeth (cm): 24  Grade View of Cords: 2Number of attempts at approach: 1    Secured with:tape  Ease of procedure: easy  Dentition: Intact and Unchanged

## 2020-03-05 NOTE — ANESTHESIA POSTPROCEDURE EVALUATION
Patient: Sb Olivo    Procedure(s):  Posterior Ankle Scope, extensive debridement,Ostrigoneum removal, FHL peroneal Tendon debridement    Diagnosis:Ankle pain [M25.579]  Diagnosis Additional Information: No value filed.    Anesthesia Type:  General, Peripheral Nerve Block, For Post-op pain in coordination with surgeon, ETT    Note:  Anesthesia Post Evaluation    Patient location during evaluation: Phase 2  Patient participation: Able to participate in evaluation but full recovery from regional anesthesia has not yet ocurrred but is anticipated to occur within 48 hours  Level of consciousness: awake and alert  Pain management: adequate  Airway patency: patent  Cardiovascular status: acceptable  Respiratory status: acceptable  Hydration status: acceptable  PONV: none     Anesthetic complications: None          Last vitals:  Vitals:    03/05/20 1300 03/05/20 1315 03/05/20 1330   BP: 129/79 135/88 138/84   Pulse: 77 82 77   Resp:      Temp:      SpO2: 95% 92% 91%         Electronically Signed By: ALEX DOHERTY CRNA  March 5, 2020  2:39 PM

## 2020-03-05 NOTE — DISCHARGE INSTRUCTIONS
[facility]  Adult Discharge Orders & Instructions      For 24 hours after surgery:  1. Get plenty of rest.  A responsible adult must stay with you for at least 24 hours after you leave the hospital.   2. You may feel lightheaded.  IF so, sit for a few minutes before standing.  Have someone help you get up.   3. You may have a slight fever. Call the doctor if your fever is over 101 F (38.3 C) (taken under the tongue) or lasts longer than 24 hours.  4. You may have a dry mouth, a sore throat, muscle aches or trouble sleeping.  These should go away after 24 hours.  5. Do not make important or legal decisions.  6.   Do not drive or use heavy equipment.  If you have weakness or tingling, don't drive or use heavy equipment until this feeling goes away.                                                                                                                                                                         To contact a doctor, call    [phone]______________

## 2020-03-05 NOTE — OR NURSING
Patient has been discharged to home at 1435 via wheelchair accompanied by family.    Written discharge instructions were provided to patient.  Prescriptions were sent to patients pharmacy for . Patient is wearing his walking boot on his operative foot, using a cold pack, and he denies any pain, nausea, or dizziness upon discharge.      Patient and adult caring for them verbalize understanding of discharge instructions including no driving until tomorrow and no longer taking narcotic pain medications - no operating mechanical equipment and no making any important decisions.They understand reason for discharge, and necessary follow-up appointments.      Alisa Juárez RN
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
## 2020-03-05 NOTE — ANESTHESIA PREPROCEDURE EVALUATION
Anesthesia Pre-Procedure Evaluation    Patient: Sb Olivo   MRN: [id number] : 1980          Preoperative Diagnosis: Ankle pain [M25.579]    Procedure(s):  Posterior Ankle Scope, Ostrigoneum Resection, FHL peroneal Tenosynovectomy    Past Medical History:   Diagnosis Date     Psoriasis      Past Surgical History:   Procedure Laterality Date     LIVER BIOPSY  2017     OPEN REDUCTION INTERNAL FIXATION ANKLE Bilateral 2019    Procedure: OPEN REDUCTION INTERNAL FIXATION, FRACTURE, Talus;  Surgeon: Chris Carter DPM;  Location:  OR       Anesthesia Evaluation     . Pt has had prior anesthetic.     No history of anesthetic complications          ROS/MED HX    ENT/Pulmonary:     (+)sleep apnea, uses CPAP , . .    Neurologic:  - neg neurologic ROS     Cardiovascular:     (+) hypertension----. : . . . :. .       METS/Exercise Tolerance:  >4 METS   Hematologic:  - neg hematologic  ROS       Musculoskeletal:  - neg musculoskeletal ROS       GI/Hepatic: Comment: CALDERON    (+) liver disease,       Renal/Genitourinary:  - ROS Renal section negative       Endo:     (+) type II DM .      Psychiatric:  - neg psychiatric ROS       Infectious Disease:  - neg infectious disease ROS       Malignancy:      - no malignancy   Other:    - neg other ROS                      Physical Exam  Normal systems: cardiovascular, pulmonary and dental    Airway   Mallampati: I  TM distance: >3 FB  Neck ROM: full    Dental     Cardiovascular   Rhythm and rate: regular and normal      Pulmonary    breath sounds clear to auscultation            Lab Results   Component Value Date    WBC 8.5 2019    HGB 12.6 (L) 2019    HCT 42.6 2019     2019     2020    POTASSIUM 3.9 2020    CHLORIDE 103 2020    CO2 25 2020    BUN 13 2020    CR 1.16 2020     (H) 2020    GRAHAM 9.9 2020    MAG 2.0 2017    ALBUMIN 4.4 2017    PROTTOTAL 8.2 2017     ALKPHOS 66 08/31/2017    BILITOTAL 0.2 (L) 08/31/2017       Preop Vitals  BP Readings from Last 3 Encounters:   03/05/20 138/84   03/03/20 (!) 148/78   08/14/19 116/87    Pulse Readings from Last 3 Encounters:   03/03/20 86   08/14/19 68   08/08/19 74      Resp Readings from Last 3 Encounters:   03/05/20 16   03/03/20 16   08/14/19 16    SpO2 Readings from Last 3 Encounters:   03/05/20 94%   03/03/20 97%   08/09/19 98%      Temp Readings from Last 1 Encounters:   03/05/20 98  F (36.7  C) (Temporal)    Ht Readings from Last 1 Encounters:   03/03/20 1.829 m (6')      Wt Readings from Last 1 Encounters:   03/03/20 116.4 kg (256 lb 9.6 oz)    Estimated body mass index is 34.8 kg/m  as calculated from the following:    Height as of 3/3/20: 1.829 m (6').    Weight as of 3/3/20: 116.4 kg (256 lb 9.6 oz).       Anesthesia Plan      History & Physical Review      ASA Status:  2 .    NPO Status:  > 6 hours    Plan for General, Peripheral Nerve Block, For Post-op pain in coordination with surgeon and ETT with Intravenous induction. Maintenance will be Balanced.    PONV prophylaxis:  Ondansetron (or other 5HT-3)       Postoperative Care      Consents  Anesthetic plan, risks, benefits and alternatives discussed with:  Patient..                 ALEX KRAUS CRNA

## 2020-03-05 NOTE — ANESTHESIA PROCEDURE NOTES
Peripheral nerve/Neuraxial procedure note :  Pre-Procedure  Performed by  Tejinder Carrillo APRN CRNA   Location: pre-op    Procedure Times:3/5/2020 9:48 AM and 3/5/2020 10:07 AM  Pre-Anesthestic Checklist: patient identified, IV checked, site marked, risks and benefits discussed, informed consent, monitors and equipment checked, pre-op evaluation, at physician/surgeon's request and post-op pain management    Timeout  Correct Patient: Yes   Correct Procedure: Yes   Correct Site: Yes   Correct Laterality: Yes   Correct Position: Yes   Site Marked: Yes   .         Assessment/Narrative  .  .  . Comments:  Ultrasound Guided Popliteal Sciatic and Saphenous Nerve Block for Post-Op Pain Management    Patient: NEEMA WILL  Patient : 1980  Date: 2020  Procedure time:  0948 to 1007  Diagnosis: Ankle pain.    Surgeon requests Popliteal/ Saphenous nerve block for post-op pain management.    The procedure, potential benefits, risks, and alternatives were discussed during the preoperative interview with the patient.  The patient voiced understanding of the information and agreed to proceed with the procedure.    Universal protocol was followed.  TIME OUT conducted just prior to starting procedure confirmed patient identity, site/side, procedure, patient position and availability of correct equipment and implants.    Anesthesia: 0.5% Bupivicaine subcutaneously  Sedation-2mg Versed IV by DSU RN    The left lateral thigh was prepped with chlohexidine.  A 22 gauge 4 inch Stimuplex insulated needle was advanced under ultrasound guidance until adjacent to but not within the sciatic nerve at the level of the nerve bifurcation.  20ml of 0.5% Bupivacaine with 2 mg decadron was injected incrementally with confirmation of negative aspiration. There was good visualization of local anesthetic spread around the sciatic nerve with no signs of intraneural orintravascular injection. No parasthesias were elicited either  during needle placement or medication injection. There were no other immediate complications apparent. An image was saved to the ultrasound machine and a print was made for the chart.    An additional 20cc of 0.5% Bupivicaine with 2mg of PF Decadron was placed around the left saphenous nerve, on the medial aspect of the mid-thigh under the Sartorius muscle, utilizing a 22 gauge 4 inch Stimuplexinsulated needle and ultrasound guidance after the area was prepped with chlorhexidine. No parasthesias were elicited either during needle placement or medication injection.   There were no other immediate complications apparent. An image was saved to the ultrasound machine and a print was made for the chart.    Electronically Signed by  ALEX KRAUS CRNA on 3/5/2020 at 10:19 AM

## 2020-03-12 ENCOUNTER — OFFICE VISIT (OUTPATIENT)
Dept: ORTHOPEDICS | Facility: OTHER | Age: 40
End: 2020-03-12
Attending: PODIATRIST
Payer: COMMERCIAL

## 2020-03-12 DIAGNOSIS — Z00.00 ROUTINE GENERAL MEDICAL EXAMINATION AT A HEALTH CARE FACILITY: Primary | ICD-10-CM

## 2020-03-12 PROCEDURE — 99024 POSTOP FOLLOW-UP VISIT: CPT | Performed by: PODIATRIST

## 2020-03-12 NOTE — PROGRESS NOTES
Visit Date:   2020      HISTORY OF PRESENT ILLNESS:  The patient is a week out from posterior scope with os trigonum resection or talar process fracture resection, FHL tendon debridement, doing well.  No acute concern.      PHYSICAL EXAMINATION:  Surgical site well-healed.  He comes in walking without his bandage on.  Sutures were removed.  He has no concerns for infection.  Surgical sites are well-healed.        ASSESSMENT:  Status post left ankle surgery as described.        PLAN:  Discussed progression and treatment.  The patient is released to progress as tolerated, giving him exercises I would like him to do routinely including calf stretching, balance exercises, as well as calf strengthening such a single heel rise.  He will work on this.  Follow up with continued pain.         BRIAN DWYER DPM             D: 2020   T: 2020   MT: KENNY      Name:     NEEMA WILL   MRN:      [id number]        Account:      [id number]   :      1980           Visit Date:   2020      Document: [id number]

## 2020-03-12 NOTE — PROGRESS NOTES
Patient is here for a postop follow up on his left ankle scope.   Rajni Elizabeth LPN .....................3/12/2020 11:22 AM

## 2020-07-29 DIAGNOSIS — M25.571 ACUTE RIGHT ANKLE PAIN: Primary | ICD-10-CM

## 2020-07-30 ENCOUNTER — HOSPITAL ENCOUNTER (OUTPATIENT)
Dept: GENERAL RADIOLOGY | Facility: OTHER | Age: 40
End: 2020-07-30
Attending: PODIATRIST
Payer: COMMERCIAL

## 2020-07-30 ENCOUNTER — OFFICE VISIT (OUTPATIENT)
Dept: ORTHOPEDICS | Facility: OTHER | Age: 40
End: 2020-07-30
Attending: PODIATRIST
Payer: COMMERCIAL

## 2020-07-30 DIAGNOSIS — M25.571 ACUTE RIGHT ANKLE PAIN: ICD-10-CM

## 2020-07-30 DIAGNOSIS — M25.571 ACUTE RIGHT ANKLE PAIN: Primary | ICD-10-CM

## 2020-07-30 DIAGNOSIS — S82.891A CLOSED FRACTURE OF RIGHT ANKLE, INITIAL ENCOUNTER: ICD-10-CM

## 2020-07-30 PROCEDURE — 73590 X-RAY EXAM OF LOWER LEG: CPT | Mod: RT

## 2020-07-30 PROCEDURE — 73610 X-RAY EXAM OF ANKLE: CPT | Mod: RT,76

## 2020-07-30 PROCEDURE — 73610 X-RAY EXAM OF ANKLE: CPT | Mod: RT

## 2020-07-30 PROCEDURE — 99202 OFFICE O/P NEW SF 15 MIN: CPT | Performed by: PODIATRIST

## 2020-07-30 NOTE — PROGRESS NOTES
Patient is here for consult on his right ankle injury.  Rajni Elizabeth LPN .....................7/30/2020 9:20 AM
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
## 2020-08-11 DIAGNOSIS — M25.571 ACUTE RIGHT ANKLE PAIN: Primary | ICD-10-CM

## 2020-08-13 ENCOUNTER — HOSPITAL ENCOUNTER (OUTPATIENT)
Dept: GENERAL RADIOLOGY | Facility: OTHER | Age: 40
End: 2020-08-13
Attending: PODIATRIST
Payer: COMMERCIAL

## 2020-08-13 ENCOUNTER — OFFICE VISIT (OUTPATIENT)
Dept: ORTHOPEDICS | Facility: OTHER | Age: 40
End: 2020-08-13
Attending: PODIATRIST
Payer: COMMERCIAL

## 2020-08-13 DIAGNOSIS — S82.891A CLOSED FRACTURE OF RIGHT ANKLE, INITIAL ENCOUNTER: Primary | ICD-10-CM

## 2020-08-13 DIAGNOSIS — M25.571 ACUTE RIGHT ANKLE PAIN: ICD-10-CM

## 2020-08-13 PROCEDURE — 99212 OFFICE O/P EST SF 10 MIN: CPT | Performed by: PODIATRIST

## 2020-08-13 PROCEDURE — 73610 X-RAY EXAM OF ANKLE: CPT | Mod: RT
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
## 2020-08-13 NOTE — PROGRESS NOTES
Patient is here for follow up on his right ankle.  Rajni Elizabeth LPN .....................8/13/2020 10:39 AM

## 2020-09-23 DIAGNOSIS — I10 BENIGN ESSENTIAL HTN: ICD-10-CM

## 2020-09-24 RX ORDER — LISINOPRIL 20 MG/1
TABLET ORAL
Qty: 90 TABLET | Refills: 2 | Status: SHIPPED | OUTPATIENT
Start: 2020-09-24 | End: 2021-06-21

## 2020-09-24 NOTE — TELEPHONE ENCOUNTER
[facility] sent Rx request for the following:   isinopril (ZESTRIL) 20 MG tablet  Sig:  TAKE 1 TABLET(20 MG) BY MOUTH DAILY    Last Prescription Date:   7/11/2019  Last Fill Qty/Refills:         90, R-3    Last Office Visit:              3/3/2020   Future Office visit:           None    Prescription refilled per RN Medication Refill Policy.................... Gayatri Frausto RN ....................  9/24/2020   3:52 PM

## 2021-04-04 ENCOUNTER — NURSE TRIAGE (OUTPATIENT)
Dept: NURSING | Facility: CLINIC | Age: 41
End: 2021-04-04

## 2021-04-04 ENCOUNTER — HOSPITAL ENCOUNTER (EMERGENCY)
Facility: OTHER | Age: 41
Discharge: HOME | End: 2021-04-04
Attending: STUDENT IN AN ORGANIZED HEALTH CARE EDUCATION/TRAINING PROGRAM | Admitting: STUDENT IN AN ORGANIZED HEALTH CARE EDUCATION/TRAINING PROGRAM
Payer: COMMERCIAL

## 2021-04-04 VITALS
SYSTOLIC BLOOD PRESSURE: 147 MMHG | HEIGHT: 72 IN | OXYGEN SATURATION: 98 % | RESPIRATION RATE: 18 BRPM | WEIGHT: 250 LBS | TEMPERATURE: 98.2 F | DIASTOLIC BLOOD PRESSURE: 78 MMHG | BODY MASS INDEX: 33.86 KG/M2 | HEART RATE: 87 BPM

## 2021-04-04 DIAGNOSIS — J06.9 VIRAL URI: ICD-10-CM

## 2021-04-04 DIAGNOSIS — Z20.822 PERSON UNDER INVESTIGATION FOR COVID-19: ICD-10-CM

## 2021-04-04 LAB
FLUAV RNA RESP QL NAA+PROBE: NEGATIVE
FLUBV RNA RESP QL NAA+PROBE: NEGATIVE
LABORATORY COMMENT REPORT: NORMAL
RSV RNA SPEC QL NAA+PROBE: NEGATIVE
SARS-COV-2 RNA RESP QL NAA+PROBE: NEGATIVE
SPECIMEN SOURCE: NORMAL

## 2021-04-04 PROCEDURE — 99282 EMERGENCY DEPT VISIT SF MDM: CPT | Performed by: STUDENT IN AN ORGANIZED HEALTH CARE EDUCATION/TRAINING PROGRAM

## 2021-04-04 PROCEDURE — C9803 HOPD COVID-19 SPEC COLLECT: HCPCS | Performed by: STUDENT IN AN ORGANIZED HEALTH CARE EDUCATION/TRAINING PROGRAM

## 2021-04-04 PROCEDURE — 99283 EMERGENCY DEPT VISIT LOW MDM: CPT | Performed by: STUDENT IN AN ORGANIZED HEALTH CARE EDUCATION/TRAINING PROGRAM

## 2021-04-04 PROCEDURE — 87636 SARSCOV2 & INF A&B AMP PRB: CPT | Performed by: STUDENT IN AN ORGANIZED HEALTH CARE EDUCATION/TRAINING PROGRAM

## 2021-04-04 PROCEDURE — 250N000013 HC RX MED GY IP 250 OP 250 PS 637: Performed by: STUDENT IN AN ORGANIZED HEALTH CARE EDUCATION/TRAINING PROGRAM

## 2021-04-04 RX ADMIN — IBUPROFEN 600 MG: 400 TABLET ORAL at 20:09

## 2021-04-04 ASSESSMENT — MIFFLIN-ST. JEOR: SCORE: 2071.99

## 2021-04-05 NOTE — TELEPHONE ENCOUNTER
"[facility]: Dr BOGDAN Zarate  SX began yesterday am: sore throat, pain=\"3\", nasal congestion, T=97.8 temporal. Cough is productive, phlegm has not been seen. He states he is a little short of breath with activity. Headache, pain=\"5\".   He has taken OTC:  Mucinex 1200mg every 12 hr, and Day Quil every 6 hrs containing acetaminophen.   Type 2 diabetes: on Metformin, HTN on Lisinopril.   Pharmacy: [facility] in [place], [place].  Wife wants to know if he needs to go to the ER tonight or if he can wait until tomorrow to be tested ?    Triaged to a disposition of Call PCP now. PCP [facility], no oncall provider. Advised patient to go to the ER now, since he is a high risk patient for COVID.    Ashley Smith RN Triage Nurse Advisor 7:34 PM 4/4/2021.     Reason for Disposition    [1] HIGH RISK patient (e.g., age > 64 years, diabetes, heart or lung disease, weak immune system) AND [2] new or worsening symptoms    Additional Information    Negative: SEVERE difficulty breathing (e.g., struggling for each breath, speaks in single words)    Negative: Difficult to awaken or acting confused (e.g., disoriented, slurred speech)    Negative: Bluish (or gray) lips or face now    Negative: Shock suspected (e.g., cold/pale/clammy skin, too weak to stand, low BP, rapid pulse)    Negative: Sounds like a life-threatening emergency to the triager    Negative: [1] COVID-19 exposure AND [2] no symptoms    Negative: [1] Lives with someone known to have influenza (flu test positive) AND [2] flu-like symptoms (e.g., cough, runny nose, sore throat, SOB; with or without fever)    Negative: [1] Adult with possible COVID-19 symptoms AND [2] triager concerned about severity of symptoms or other causes    Negative: COVID-19 vaccine reaction suspected (e.g., fever, headache, muscle aches) occurring during days 1-3 after getting vaccine    Negative: COVID-19 vaccine, questions about    Negative: COVID-19 and breastfeeding, questions " about    Negative: SEVERE or constant chest pain or pressure (Exception: mild central chest pain, present only when coughing)    Negative: MODERATE difficulty breathing (e.g., speaks in phrases, SOB even at rest, pulse 100-120)    Negative: [1] Headache AND [2] stiff neck (can't touch chin to chest)    Negative: MILD difficulty breathing (e.g., minimal/no SOB at rest, SOB with walking, pulse <100)    Negative: Chest pain or pressure    Negative: Patient sounds very sick or weak to the triager    Negative: Fever > 103 F (39.4 C)    Negative: [1] Fever > 101 F (38.3 C) AND [2] age > 60    Negative: [1] Fever > 100.0 F (37.8 C) AND [2] bedridden (e.g., nursing home patient, CVA, chronic illness, recovering from surgery)    Protocols used: CORONAVIRUS (COVID-19) DIAGNOSED OR [id number] 1.3  COVID 19 Nurse Triage Plan/Patient Instructions    Please be aware that novel coronavirus (COVID-19) may be circulating in the community. If you develop symptoms such as fever, cough, or SOB or if you have concerns about the presence of another infection including coronavirus (COVID-19), please contact your health care provider or visit [email or web address].     Disposition/Instructions    ED Visit recommended. Follow protocol based instructions.     Bring Your Own Device:  Please also bring your smart device(s) (smart phones, tablets, laptops) and their charging cables for your personal use and to communicate with your care team during your visit.    Thank you for taking steps to prevent the spread of this virus.  o Limit your contact with others.  o Wear a simple mask to cover your cough.  o Wash your hands well and often.    Resources    [facility] [place]: About COVID-19: [email or web address]    CDC: What to Do If You're Sick: www.cdc.gov/coronavirus/2019-ncov/about/steps-when-sick.html    CDC: Ending Home Isolation: www.cdc.gov/coronavirus/2019-ncov/hcp/disposition-in-home-patients.html     CDC: Caring for  Someone: www.cdc.gov/coronavirus/2019-ncov/if-you-are-sick/care-for-someone.html     [facility]: Interim Guidance for Hospital Discharge to Home: www.health.[facility].[place]/diseases/coronavirus/hcp/hospdischarge.pdf    [facility] clinical trials (COVID-19 research studies): [email or web address]-clinical-trials     Below are the COVID-19 hotlines at the [place] [facility] ([facility]). Interpreters are available.   o For health questions: Call 072-527-8871 or 1-328.470.4511 (7 a.m. to 7 p.m.)  o For questions about schools and childcare: Call 445-608-6895 or 1-168.414.6919 (7 a.m. to 7 p.m.)

## 2021-04-05 NOTE — ED TRIAGE NOTES
ED Nursing Triage Note (General)   ________________________________    Sb MARCEL Olivo is a 40 year old Male that presents to triage private car  With history of  Sore throat, cough, nasal congestion reported by patient   Significant symptoms had onset 1  day(s) ago.  There were no vitals taken for this visit.t  Patient appears alert , in no acute distress., and cooperative behavior.    GCS Total = 15  Airway: intact  Breathing noted as Normal.  Circulation Normal  Skin normal  Action taken:  Triage to critical care immediately, precautions started      PRE HOSPITAL PRIOR LIVING SITUATION Spouse

## 2021-04-05 NOTE — ED PROVIDER NOTES
Emergency Department Provider Note  : 1980 Age: 40 year old Sex: male MRN: [id number]    Chief Complaint   Patient presents with     Cough     Nasal Congestion     Pharyngitis       Medical Decision Making / Assessment / Plan   40 year old male presenting with viral URI-like symptoms including myalgias, headache, sore throat, nonproductive cough, congestion.  No measured fevers at home.   very mildly hypertensive but afebrile nontachycardic.  Satting 1% on room air.  Normal respiratory effort.  Exam is reassuring without evidence of significant pharyngitis.  No oral pharyngeal edema.  Suspect Covid versus some nondescript viral URI versus influenza.  Covid swab sent.  Patient given ibuprofen and will be discharged home with covid precautions. Will call him with the results.    The patient was informed of the plan and verbalized understanding and agreed with the plan. The patient was given strict return to Emergency Department precautions as well as appropriate follow up instructions. The patient was discharged in stable condition.    New Prescriptions    No medications on file       Final diagnoses:   Person under investigation for COVID-19   Viral URI       Eduard Langley MD  2021   Emergency Department    Subjective   Sb is a 40 year old male with PMH of hypertension who presents at  7:44 PM for evaluation of now 2 to 3 days of mild headache, nonproductive cough, sore throats, myalgias.  No reports of fevers.  No GI symptoms.  No shortness of breath or chest pain.  Lives at home with family who have not had similar symptoms.  No known Covid exposures.  No other symptoms or complaints.    I have reviewed the Medications, Allergies, Past Medical and Surgical History, and Social History in the [facility] and with family.    Review of Systems:  Please see HPI for pertinent positives and negatives. All other systems reviewed and found to be negative.      Objective   BP: (!) 147/78  Pulse: 87  Temp:  98.2  F (36.8  C)  Resp: 18  Height: 182.9 cm (6')  Weight: 113.4 kg (250 lb)  SpO2: 98 %    Physical Exam:   Gen: Comfortable. NAD  HEENT: MMM.  posterior oropharynx without tonsillar hypertrophy or exudate.  No oropharyngeal edema.  No palpable cervical lymphadenopathy.  Eye: EOMI.   CV: RRR. Pulses intact.  Pulm: Nonlabored, equal chest rise  Abd: Soft, ND. NTTP.   Ext: Full ROM. No edema  Neuro: AOx3, no focal deficit  Psych: Appropriate affect, cognition intact    Procedures / Critical Care   Procedures    Critical Care Time: None.          Medical/Surgical History:  Past Medical History:   Diagnosis Date     Psoriasis      Past Surgical History:   Procedure Laterality Date     ARTHROSCOPY ANKLE Left 3/5/2020    Procedure: Posterior Ankle Scope, extensive debridement,Ostrigoneum removal, FHL peroneal Tendon debridement;  Surgeon: Chris Carter DPM;  Location:  OR     LIVER BIOPSY  2017     OPEN REDUCTION INTERNAL FIXATION ANKLE Bilateral 8/8/2019    Procedure: OPEN REDUCTION INTERNAL FIXATION, FRACTURE, [place];  Surgeon: Chris Crater DPM;  Location:  OR       Medications:  Current Facility-Administered Medications   Medication     ibuprofen (ADVIL/MOTRIN) tablet 600 mg     Current Outpatient Medications   Medication     hydrOXYzine (ATARAX) 25 MG tablet     lisinopril (ZESTRIL) 20 MG tablet     multivitamin w/minerals (MULTI-VITAMIN) tablet     omega 3 1000 MG CAPS     ondansetron (ZOFRAN-ODT) 4 MG ODT tab     oxyCODONE (ROXICODONE) 5 MG tablet     ustekinumab (STELARA) 90 MG/ML     venlafaxine (EFFEXOR-XR) 75 MG 24 hr capsule       Allergies:  Penicillins, Amoxicillin, and Apremilast    Relevant labs, images, EKGs, Epic and outside hospital (if applicable) charts were reviewed. The findings, diagnosis, plan, and need for follow up were discussed with the patient/family. Nursing notes were reviewed.

## 2021-04-05 NOTE — DISCHARGE INSTRUCTIONS
Acute onset, return to the emergency department if you develop any significant shortness of breath or other symptoms concerning to you.

## 2021-05-05 ENCOUNTER — PATIENT OUTREACH (OUTPATIENT)
Dept: FAMILY MEDICINE | Facility: OTHER | Age: 41
End: 2021-05-05

## 2021-05-12 ENCOUNTER — PATIENT OUTREACH (OUTPATIENT)
Dept: FAMILY MEDICINE | Facility: OTHER | Age: 41
End: 2021-05-12

## 2021-05-12 NOTE — TELEPHONE ENCOUNTER
Patient Quality Outreach      Summary:    Patient has the following on his problem list/HM:   Diabetes    Last A1C:  Lab Results   Component Value Date    A1C 6.1 03/03/2020       Last LDL:    No results found for: LDL    Is the patient on a Statin? No          Is the patient on Aspirin? No        Last three blood pressure readings:  BP Readings from Last 3 Encounters:   04/04/21 (!) 147/78   03/05/20 118/70   03/03/20 (!) 148/78            Tobacco Use      Smoking status: Never Smoker      Smokeless tobacco: Current User        Types: Chew          Hypertension   Last three blood pressure readings:  BP Readings from Last 3 Encounters:   04/04/21 (!) 147/78   03/05/20 118/70   03/03/20 (!) 148/78     Blood pressure: Failed    HTN Guidelines:  ? 139/89     Patient is due/failing the following:   BP check and Hypertension follow-up visit and Adult/Adolescent physical, date due: now    Type of outreach:    Sent letter.    Questions for provider review:    None                                                                                                                                     Norma J. Gosselin, LPN       Chart routed to Care Team.

## 2021-05-12 NOTE — LETTER
[facility]  [address]  [place] [id number] [phone]       May 12, 2021    Sb Olivo  [address] [address]    Dear Sb,    We care about your health and have reviewed your health plan and are making recommendations based on this review, to optimize your health.    You are in particular need of attention regarding:  -Diabetes  -High Blood Pressure  -Wellness (Physical) Visit   -Tobacco use    We are recommending that you:  -schedule a NURSE-ONLY BLOOD PRESSURE APPOINTMENT within the next 1-4 weeks.    -schedule a FOLLOWUP OFFICE APPOINTMENT with Yuriy Zarate.       -schedule a WELLNESS (Physical) APPOINTMENT with Yuriy Zarate.   I will check fasting labs the same day - nothing to eat except water and meds for 8-10 hours prior.    Also, if you are smoking still and would like some help, then please contact us or make an appointment to discuss your options ([email or web address] has very good free information.)                                                          -Diabetic Eye Exam is due.  If this was done within the last 12 months then please contact the clinic with the date and location so we can update your records.    In addition, here is a list of due or overdue Health Maintenance reminders.    Health Maintenance Due   Topic Date Due     Preventive Care Visit  Never done     Cholesterol Lab  Never done     Kidney Microalbumin Urine Test  Never done     Diabetic Foot Exam  Never done     Discuss Advance Care Planning  Never done     Eye Exam  Never done     Pneumococcal Vaccine (1 of 2 - PPSV23) Never done     HIV Screening  Never done     COVID-19 Vaccine (1) Never done     Hepatitis C Screening  Never done     Hepatitis B Vaccine (2 of 3 - Hep B Twinrix risk 3-dose series) 12/21/2005     A1C Lab  06/03/2020     PHQ-2  01/01/2021     Basic Metabolic Panel  03/03/2021       To address the above recommendations, we encourage you to contact us at  [phone]. They will assist you with finding the most convenient time and location.    Thank you for trusting [facility] and we appreciate the opportunity to serve you.  We look forward to supporting your healthcare needs in the future.    Healthy Regards,    Your [facility] Team

## 2021-06-19 DIAGNOSIS — I10 BENIGN ESSENTIAL HTN: ICD-10-CM

## 2021-06-19 NOTE — LETTER
June 21, 2021      Sb Olivo  [address] [address]        Dear Sb,         A refill of lisinopril (ZESTRIL) 20 MG tablet has been requested by your pharmacy.  We noticed that it has been greater than 12 months since your last comprehensive visit and labs with Yuriy Zarate MD.  A limited 90 day supply has been sent to your pharmacy at this time.    Additional refills require a medication management appointment.  Your health is very important to us.  Please call the clinic at [phone] to schedule your appointment.    Thank you,    The Refill Nurse  [facility]

## 2021-06-21 RX ORDER — LISINOPRIL 20 MG/1
TABLET ORAL
Qty: 90 TABLET | Refills: 0 | Status: SHIPPED | OUTPATIENT
Start: 2021-06-21 | End: 2021-09-21

## 2021-06-21 NOTE — TELEPHONE ENCOUNTER
"[facility] sent Rx request for the following:   lisinopril (ZESTRIL) 20 MG tablet  Sig: TAKE 1 TABLET(20 MG) BY MOUTH DAILY    Last Prescription Date:   09/24/2020  Last Fill Qty/Refills:         90, R-2    Last Office Visit:              03/03/2020 (Lieliliya)   Future Office visit:           None noted   ACE Inhibitors (Including Combos) Protocol Failed - 6/19/2021  5:22 AM        Failed - Recent (12 mo) or future (30 days) visit within the authorizing provider's specialty     Patient has had an office visit with the authorizing provider or a provider within the authorizing providers department within the previous 12 mos or has a future within next 30 days. See \"Patient Info\" tab in inbasket, or \"Choose Columns\" in Meds & Orders section of the refill encounter.              Failed - Normal serum creatinine on file in past 12 months     Recent Labs   Lab Test 03/03/20  1343   CR 1.16       Ok to refill medication if creatinine is low          Failed - Normal serum potassium on file in past 12 months     Recent Labs   Lab Test 03/03/20  1343   POTASSIUM 3.9        Patient overdue for annual review. Outreach previously done, appointment letter sent now. Prescription approved per [facility] Refill Protocol for 90 day janneth refill with notation made to requesting pharmacy. Alcira Noriega RN ....................  6/21/2021   1:02 PM      "

## 2021-09-17 DIAGNOSIS — I10 BENIGN ESSENTIAL HTN: ICD-10-CM

## 2021-09-17 NOTE — LETTER
September 21, 2021      Sb Olivo  [address] [address]        Dear Sb,         A refill of lisinopril (ZESTRIL) 20 MG tablet has been requested by your pharmacy and we noticed that you are overdue for an annual exam.  Your last comprehensive visit with Tony Mathias MD was on 03/03/2020.    This refill request has been sent to your provider for consideration at this time.    Your health is very important to us.  Please call the clinic at [phone] to schedule your appointment.    Thank you for choosing [facility] for your health care needs.    Sincerely,    Refill RN  [facility]

## 2021-09-21 RX ORDER — LISINOPRIL 20 MG/1
TABLET ORAL
Qty: 30 TABLET | Refills: 0 | Status: SHIPPED | OUTPATIENT
Start: 2021-09-21 | End: 2021-10-18

## 2021-09-21 NOTE — TELEPHONE ENCOUNTER
"[facility] sent Rx request for the following:   lisinopril (ZESTRIL) 20 MG tablet  Sig TAKE 1 TABLET(20 MG) BY MOUTH DAILY    Last Prescription Date:   06/21/2021  Last Fill Qty/Refills:         90, R-0    Last Office Visit:              03/03/2020 (Liebe)   Future Office visit:           None noted   ACE Inhibitors (Including Combos) Protocol Failed - 9/17/2021  5:24 AM        Failed - Blood pressure under 140/90 in past 12 months     BP Readings from Last 3 Encounters:   04/04/21 (!) 147/78   03/05/20 118/70   03/03/20 (!) 148/78                 Failed - Recent (12 mo) or future (30 days) visit within the authorizing provider's specialty     Patient has had an office visit with the authorizing provider or a provider within the authorizing providers department within the previous 12 mos or has a future within next 30 days. See \"Patient Info\" tab in inbasket, or \"Choose Columns\" in Meds & Orders section of the refill encounter.              Failed - Normal serum creatinine on file in past 12 months     Recent Labs   Lab Test 03/03/20  1343   CR 1.16       Ok to refill medication if creatinine is low          Failed - Normal serum potassium on file in past 12 months     Recent Labs   Lab Test 03/03/20  1343   POTASSIUM 3.9             Passed - Medication is active on med list        Passed - Patient is age 18 or older         Patient far overdue for annual review. Previous janneth refill given. Letter sent to patient. Routing for limited refill consideration of PCP. Unable to complete prescription refill per RN Medication Refill Policy.................... Alcira Noriega RN ....................  9/21/2021   8:45 AM          "

## 2021-10-17 DIAGNOSIS — I10 BENIGN ESSENTIAL HTN: ICD-10-CM

## 2021-10-18 NOTE — TELEPHONE ENCOUNTER
Patient previously advised for appointment, x2. No appointment made. LOV: 03/03/2020 with Dr. Zarate. Routing for review and consideration. Unable to complete prescription refill per RN Medication Refill Policy.................... Alciar Noriega RN ....................  10/18/2021   3:44 PM

## 2021-10-19 RX ORDER — LISINOPRIL 20 MG/1
TABLET ORAL
Qty: 15 TABLET | Refills: 0 | Status: SHIPPED | OUTPATIENT
Start: 2021-10-19

## 2021-12-06 ENCOUNTER — ALLIED HEALTH/NURSE VISIT (OUTPATIENT)
Dept: FAMILY MEDICINE | Facility: OTHER | Age: 41
End: 2021-12-06
Attending: FAMILY MEDICINE
Payer: COMMERCIAL

## 2021-12-06 DIAGNOSIS — R09.81 CONGESTION OF PARANASAL SINUS: ICD-10-CM

## 2021-12-06 DIAGNOSIS — Z20.822 COVID-19 RULED OUT: Primary | ICD-10-CM

## 2021-12-06 PROCEDURE — U0005 INFEC AGEN DETEC AMPLI PROBE: HCPCS | Mod: ZL

## 2021-12-06 PROCEDURE — C9803 HOPD COVID-19 SPEC COLLECT: HCPCS

## 2021-12-06 NOTE — PROGRESS NOTES
Patient swabbed for COVID-19 testing.  Sore throat cough cough congestion headache  Jennifer Hidalgo MA on 12/6/2021 at 8:05 AM

## 2021-12-07 LAB — SARS-COV-2 RNA RESP QL NAA+PROBE: POSITIVE

## 2022-01-29 ENCOUNTER — HEALTH MAINTENANCE LETTER (OUTPATIENT)
Age: 42
End: 2022-01-29

## 2022-03-02 ENCOUNTER — TRANSFERRED RECORDS (OUTPATIENT)
Dept: MULTI SPECIALTY CLINIC | Facility: CLINIC | Age: 42
End: 2022-03-02

## 2022-03-02 LAB
ALBUMIN (URINE) MG/SPEC: <0.5 MG/DL
ALBUMIN/CREATININE RATIO: <6 (ref 0–29)
CREATININE (URINE): 77 MG/DL

## 2022-04-01 ENCOUNTER — OFFICE VISIT (OUTPATIENT)
Dept: FAMILY MEDICINE | Facility: OTHER | Age: 42
End: 2022-04-01
Attending: PHYSICIAN ASSISTANT
Payer: COMMERCIAL

## 2022-04-01 VITALS
SYSTOLIC BLOOD PRESSURE: 160 MMHG | DIASTOLIC BLOOD PRESSURE: 80 MMHG | TEMPERATURE: 98.6 F | RESPIRATION RATE: 12 BRPM | HEART RATE: 84 BPM | BODY MASS INDEX: 36.06 KG/M2 | WEIGHT: 265.9 LBS | OXYGEN SATURATION: 98 %

## 2022-04-01 DIAGNOSIS — J02.9 SORETHROAT: Primary | ICD-10-CM

## 2022-04-01 DIAGNOSIS — J02.0 STREP PHARYNGITIS: ICD-10-CM

## 2022-04-01 LAB — GROUP A STREP BY PCR: DETECTED

## 2022-04-01 PROCEDURE — 250N000009 HC RX 250: Performed by: PHYSICIAN ASSISTANT

## 2022-04-01 PROCEDURE — 99213 OFFICE O/P EST LOW 20 MIN: CPT | Performed by: PHYSICIAN ASSISTANT

## 2022-04-01 PROCEDURE — 87651 STREP A DNA AMP PROBE: CPT | Mod: ZL | Performed by: PHYSICIAN ASSISTANT

## 2022-04-01 RX ORDER — AZITHROMYCIN 250 MG/1
TABLET, FILM COATED ORAL
Qty: 6 TABLET | Refills: 0 | Status: SHIPPED | OUTPATIENT
Start: 2022-04-01 | End: 2022-04-06

## 2022-04-01 RX ORDER — BUPROPION HYDROCHLORIDE 150 MG/1
150 TABLET, EXTENDED RELEASE ORAL DAILY
COMMUNITY
Start: 2022-03-27

## 2022-04-01 RX ORDER — DEXAMETHASONE SODIUM PHOSPHATE 4 MG/ML
10 VIAL (ML) INJECTION ONCE
Status: COMPLETED | OUTPATIENT
Start: 2022-04-01 | End: 2022-04-01

## 2022-04-01 RX ADMIN — DEXAMETHASONE SODIUM PHOSPHATE 10 MG: 4 INJECTION, SOLUTION INTRAMUSCULAR; INTRAVENOUS at 17:27

## 2022-04-01 ASSESSMENT — PAIN SCALES - GENERAL: PAINLEVEL: MODERATE PAIN (5)

## 2022-04-01 NOTE — PATIENT INSTRUCTIONS
Tonsillitis: you will be prescribed an antibiotic, please take the entire course of antibiotic, even if feeling better prior to this. Continue with symptomatic treatment below as needed. Please change toothbrush on day 2.     Please refer to your AVS for follow up and pain/symptoms management recommendations (I.e.: medications, helpful conservative treatment modalities, appropriate follow up if need to a specialist or family practice, etc.). Please return to urgent care if your symptoms change or worsen.     Discharge instructions:  -If you were prescribed a medication(s), please take this as prescribed/directed  -Monitor your symptoms, if changing/worsening, return to UC/ER or PCP for follow up    Symptomatic treatments recommended.  - Antibiotics will not help with your symptoms, unless you were told otherwise today (strep throat, ear infection, etc. ). Education provided on symptoms of secondary bacterial infection such as new fever, chills, rigors, shortness of breath, increased work of breathing, that can occur with viral URI and need for further evaluation, if they occur.   - Ensure you are staying hydrated by drinking plenty of fluids and eating mild foods and advance diet as tolerated  - Honey can be soothing for sore throat (as long as above 12 months of age)  - Warm salt water gurgles can help soothe sore throat  - Humidifier can help with congestion and help keep mucus membranes such as throat and nose from drying out.  - Sleeping slightly propped up can help with congestion and postnasal drainage that can worsen cough at bedtime.  - As long as you have never been told to take Tylenol and/or Ibuprofen you can use them to manage fever and body aches per package instructions  Make sure you eat when you take ibuprofen to avoid stomach upset.  - OTC cough medications per package instructions to help with cough. Check to see if the cough/cold medication already has acetaminophen (Tylenol) in it. If it does  avoid taking additional Tylenol.  - If sudden onset of new fever, worsening symptoms return for further evaluation.  - OTC antihistamine such as Allegra, Zyrtec, Claritin (generic is okay) can help with nasal/sinus congestion and OTC nasal steroid such as Flonase can help decrease sinus inflammation to help with congestion.  - Education provided on symptoms of post-viral bacterial infections including ear infection and pneumonia. This would require re-evaluation for treatment.

## 2022-04-01 NOTE — NURSING NOTE
Chief Complaint   Patient presents with     Pharyngitis     and little bit of a headache     Patient stated his s/s started on Monday.     Initial BP (!) 166/110 (BP Location: Left arm, Patient Position: Sitting, Cuff Size: Adult Large)   Pulse 84   Temp 98.6  F (37  C) (Tympanic)   Resp 12   Wt 120.6 kg (265 lb 14.4 oz)   SpO2 98%   BMI 36.06 kg/m   Estimated body mass index is 36.06 kg/m  as calculated from the following:    Height as of 4/4/21: 1.829 m (6').    Weight as of this encounter: 120.6 kg (265 lb 14.4 oz).  Medication Reconciliation: Completed     Advanced Care Directive Reviewed    Cirilo Flood LPN

## 2022-07-16 ENCOUNTER — HEALTH MAINTENANCE LETTER (OUTPATIENT)
Age: 42
End: 2022-07-16

## 2022-09-17 ENCOUNTER — HEALTH MAINTENANCE LETTER (OUTPATIENT)
Age: 42
End: 2022-09-17

## 2023-01-28 ENCOUNTER — HEALTH MAINTENANCE LETTER (OUTPATIENT)
Age: 43
End: 2023-01-28

## 2023-05-06 ENCOUNTER — HEALTH MAINTENANCE LETTER (OUTPATIENT)
Age: 43
End: 2023-05-06

## 2023-10-08 ENCOUNTER — HEALTH MAINTENANCE LETTER (OUTPATIENT)
Age: 43
End: 2023-10-08

## 2023-12-13 NOTE — PHARMACY-ADMISSION MEDICATION HISTORY
Pharmacy -- Admission Medication Reconciliation IN PROGRESS    Prior to admission (PTA) medications were reviewed and the patient's PTA medication list was updated.    Sources Consulted: [employer], chart    The reliability of this Medication Reconciliation is: Reliability: Borderline reliable    The following significant changes were made:  Added directions to hydrocodone/acetaminophen  Added pharmacy to ustekinumab  Added [facility] to list of pharmacies    In addition, the patient's allergies were reviewed with the patient's records and left as follows:   Allergies: Penicillins; Amoxicillin; and Apremilast    Chuyita Tse [facility], 8/9/2019,  9:05 AM     
No cyanosis, no pallor, no jaundice, no rash

## 2024-02-25 ENCOUNTER — HEALTH MAINTENANCE LETTER (OUTPATIENT)
Age: 44
End: 2024-02-25

## 2024-07-13 ENCOUNTER — HEALTH MAINTENANCE LETTER (OUTPATIENT)
Age: 44
End: 2024-07-13

## 2024-11-30 ENCOUNTER — HEALTH MAINTENANCE LETTER (OUTPATIENT)
Age: 44
End: 2024-11-30

## 2025-03-09 ENCOUNTER — HEALTH MAINTENANCE LETTER (OUTPATIENT)
Age: 45
End: 2025-03-09

## (undated) DEVICE — TUBING SUCTION 10'X3/16" N510

## (undated) DEVICE — BLADE KNIFE SURG 15 371115

## (undated) DEVICE — DRSG ABDOMINAL PAD UNSTERILE 5X9" 9190

## (undated) DEVICE — DECANTER VIAL 2006S

## (undated) DEVICE — PREP CHLORAPREP 26ML TINTED ORANGE  260815

## (undated) DEVICE — BNDG ELASTIC 4" DBL LENGTH UNSTERILE 6611-14

## (undated) DEVICE — GLOVE BIOGEL PI INDICATOR 8.0 LF 41680

## (undated) DEVICE — SU VICRYL 2-0 SH 27" UND J417H

## (undated) DEVICE — GLOVE PROTEXIS BLUE W/NEU-THERA 7.5  2D73EB75

## (undated) DEVICE — TUBING ARTHROSCOPY PUMP ARTHREX AR-6410

## (undated) DEVICE — GLOVE PROTEXIS PI ORTHO PF 8.5 2D73HT76

## (undated) DEVICE — ANKLE DISTRACTION STRAP

## (undated) DEVICE — SUTURE 3-0 PROLENE FS-1 631G

## (undated) DEVICE — CAST PLASTER SPLINT 5X30" 7395

## (undated) DEVICE — GEL ULTRASOUND AQUASONIC 20GM 01-01

## (undated) DEVICE — PACK MAJOR EXTREMITY SOP15MEFCA

## (undated) DEVICE — DRSG GAUZE 4X4" TRAY 6939

## (undated) DEVICE — DRSG JUMPSTART ANTIMICROBIAL 1.5X8" ABS-4005

## (undated) DEVICE — GUIDEWIRE THRD TROCAR TIP .045" W/LASER LINE AR-8737-05

## (undated) DEVICE — ANTIFOG SOLUTION W/FOAM PAD CF-1001

## (undated) DEVICE — CAST PADDING 4" COTTON WEBRIL UNSTERILE 9084

## (undated) DEVICE — DRAPE C-ARM PACK 9"

## (undated) DEVICE — NDL 25GA 1.5" 305127

## (undated) DEVICE — SU VICRYL 2-0 CT-2 27" UND J269H

## (undated) DEVICE — CAST PADDING 4" SYNTHETIC 30-3053

## (undated) DEVICE — BUR ARTHREX COOLCUT DISSECTOR 4.0MMX13CM AR-8400DS

## (undated) DEVICE — ESU PENCIL W/COATED BLADE E2450H

## (undated) DEVICE — SOL WATER 1500ML

## (undated) DEVICE — TOURNIQUET SGL BLADDER 34"X4" PURPLE 5921-034-135

## (undated) DEVICE — CAST PADDING 6" WEBRIL II UNSTERILE 4519

## (undated) DEVICE — GLOVE PROTEXIS PI ORTHO PF 8.0 2D73HT80

## (undated) DEVICE — DRAPE C-ARMOR 5 SIDED 5523

## (undated) DEVICE — TOURNIQUET SGL BLADDER 18"X3" RED 5921-018-135

## (undated) DEVICE — DRAPE STERI U 1015

## (undated) DEVICE — DRILL BIT ARTHREX LPS CAN 2.0MM AR-8933-20C

## (undated) DEVICE — SYR 30ML LL W/O NDL 302832

## (undated) DEVICE — DRAPE EXTREMITY W/ARMBOARD 29405

## (undated) DEVICE — PAD FLOOR SURGISAFE 46X40" 84610

## (undated) DEVICE — GLOVE PROTEXIS PI ORTHO PF 7.5 2D73HT75

## (undated) DEVICE — COVER LIGHT HANDLE LT-F02

## (undated) DEVICE — Device

## (undated) DEVICE — BUR ARTHREX COOLCUT DISSECTOR 3.5MM  AR-8350DS

## (undated) RX ORDER — PHENYLEPHRINE HCL IN 0.9% NACL 20MG/250ML
PLASTIC BAG, INJECTION (ML) INTRAVENOUS
Status: DISPENSED
Start: 2019-08-08

## (undated) RX ORDER — EPHEDRINE SULFATE 50 MG/ML
INJECTION, SOLUTION INTRAVENOUS
Status: DISPENSED
Start: 2020-03-05

## (undated) RX ORDER — GLYCOPYRROLATE 0.2 MG/ML
INJECTION, SOLUTION INTRAMUSCULAR; INTRAVENOUS
Status: DISPENSED
Start: 2020-03-05

## (undated) RX ORDER — FENTANYL CITRATE 50 UG/ML
INJECTION, SOLUTION INTRAMUSCULAR; INTRAVENOUS
Status: DISPENSED
Start: 2019-08-08

## (undated) RX ORDER — HYDROCODONE BITARTRATE AND ACETAMINOPHEN 5; 325 MG/1; MG/1
TABLET ORAL
Status: DISPENSED
Start: 2019-08-04

## (undated) RX ORDER — PROPOFOL 10 MG/ML
INJECTION, EMULSION INTRAVENOUS
Status: DISPENSED
Start: 2019-08-08

## (undated) RX ORDER — MORPHINE SULFATE 0.5 MG/ML
INJECTION, SOLUTION EPIDURAL; INTRATHECAL; INTRAVENOUS
Status: DISPENSED
Start: 2019-08-08

## (undated) RX ORDER — ONDANSETRON 2 MG/ML
INJECTION INTRAMUSCULAR; INTRAVENOUS
Status: DISPENSED
Start: 2019-08-08

## (undated) RX ORDER — KETOROLAC TROMETHAMINE 30 MG/ML
INJECTION, SOLUTION INTRAMUSCULAR; INTRAVENOUS
Status: DISPENSED
Start: 2020-03-05

## (undated) RX ORDER — DEXAMETHASONE SODIUM PHOSPHATE 4 MG/ML
INJECTION, SOLUTION INTRA-ARTICULAR; INTRALESIONAL; INTRAMUSCULAR; INTRAVENOUS; SOFT TISSUE
Status: DISPENSED
Start: 2020-03-05

## (undated) RX ORDER — PHENYLEPHRINE HCL IN 0.9% NACL 1 MG/10 ML
SYRINGE (ML) INTRAVENOUS
Status: DISPENSED
Start: 2019-08-08

## (undated) RX ORDER — KETAMINE HYDROCHLORIDE 50 MG/ML
INJECTION, SOLUTION INTRAMUSCULAR; INTRAVENOUS
Status: DISPENSED
Start: 2020-03-05

## (undated) RX ORDER — PHENYLEPHRINE HCL IN 0.9% NACL 1 MG/10 ML
SYRINGE (ML) INTRAVENOUS
Status: DISPENSED
Start: 2020-03-05

## (undated) RX ORDER — LIDOCAINE HYDROCHLORIDE 10 MG/ML
INJECTION, SOLUTION EPIDURAL; INFILTRATION; INTRACAUDAL; PERINEURAL
Status: DISPENSED
Start: 2019-08-08

## (undated) RX ORDER — ONDANSETRON 2 MG/ML
INJECTION INTRAMUSCULAR; INTRAVENOUS
Status: DISPENSED
Start: 2020-03-05

## (undated) RX ORDER — LIDOCAINE HYDROCHLORIDE 20 MG/ML
INJECTION, SOLUTION EPIDURAL; INFILTRATION; INTRACAUDAL; PERINEURAL
Status: DISPENSED
Start: 2019-08-08

## (undated) RX ORDER — PROPOFOL 10 MG/ML
INJECTION, EMULSION INTRAVENOUS
Status: DISPENSED
Start: 2020-03-05

## (undated) RX ORDER — ONDANSETRON 4 MG/1
TABLET, ORALLY DISINTEGRATING ORAL
Status: DISPENSED
Start: 2019-07-01

## (undated) RX ORDER — LIDOCAINE HYDROCHLORIDE 10 MG/ML
INJECTION, SOLUTION EPIDURAL; INFILTRATION; INTRACAUDAL; PERINEURAL
Status: DISPENSED
Start: 2020-03-05

## (undated) RX ORDER — CLINDAMYCIN PHOSPHATE 900 MG/50ML
INJECTION, SOLUTION INTRAVENOUS
Status: DISPENSED
Start: 2020-03-05

## (undated) RX ORDER — EPHEDRINE SULFATE 50 MG/ML
INJECTION, SOLUTION INTRAVENOUS
Status: DISPENSED
Start: 2019-08-08

## (undated) RX ORDER — IBUPROFEN 400 MG/1
TABLET, FILM COATED ORAL
Status: DISPENSED
Start: 2021-04-04

## (undated) RX ORDER — CLINDAMYCIN PHOSPHATE 900 MG/50ML
INJECTION, SOLUTION INTRAVENOUS
Status: DISPENSED
Start: 2019-08-08

## (undated) RX ORDER — BUPIVACAINE HYDROCHLORIDE 5 MG/ML
INJECTION, SOLUTION PERINEURAL
Status: DISPENSED
Start: 2019-08-08

## (undated) RX ORDER — BUPIVACAINE HYDROCHLORIDE AND EPINEPHRINE 5; 5 MG/ML; UG/ML
INJECTION, SOLUTION EPIDURAL; INTRACAUDAL; PERINEURAL
Status: DISPENSED
Start: 2020-03-05

## (undated) RX ORDER — ONDANSETRON 2 MG/ML
INJECTION INTRAMUSCULAR; INTRAVENOUS
Status: DISPENSED
Start: 2019-07-01

## (undated) RX ORDER — SODIUM CHLORIDE, SODIUM LACTATE, POTASSIUM CHLORIDE, CALCIUM CHLORIDE 600; 310; 30; 20 MG/100ML; MG/100ML; MG/100ML; MG/100ML
INJECTION, SOLUTION INTRAVENOUS
Status: DISPENSED
Start: 2020-03-05

## (undated) RX ORDER — DEXAMETHASONE SODIUM PHOSPHATE 10 MG/ML
INJECTION, SOLUTION INTRAMUSCULAR; INTRAVENOUS
Status: DISPENSED
Start: 2020-03-05

## (undated) RX ORDER — KETOROLAC TROMETHAMINE 30 MG/ML
INJECTION, SOLUTION INTRAMUSCULAR; INTRAVENOUS
Status: DISPENSED
Start: 2019-08-08

## (undated) RX ORDER — OXYCODONE HYDROCHLORIDE 5 MG/1
TABLET ORAL
Status: DISPENSED
Start: 2019-08-04

## (undated) RX ORDER — LIDOCAINE HYDROCHLORIDE 20 MG/ML
INJECTION, SOLUTION EPIDURAL; INFILTRATION; INTRACAUDAL; PERINEURAL
Status: DISPENSED
Start: 2020-03-05

## (undated) RX ORDER — FENTANYL CITRATE 50 UG/ML
INJECTION, SOLUTION INTRAMUSCULAR; INTRAVENOUS
Status: DISPENSED
Start: 2020-03-05

## (undated) RX ORDER — DEXAMETHASONE SODIUM PHOSPHATE 4 MG/ML
INJECTION, SOLUTION INTRA-ARTICULAR; INTRALESIONAL; INTRAMUSCULAR; INTRAVENOUS; SOFT TISSUE
Status: DISPENSED
Start: 2022-04-01

## (undated) RX ORDER — DEXAMETHASONE SODIUM PHOSPHATE 4 MG/ML
INJECTION, SOLUTION INTRA-ARTICULAR; INTRALESIONAL; INTRAMUSCULAR; INTRAVENOUS; SOFT TISSUE
Status: DISPENSED
Start: 2019-08-08

## (undated) RX ORDER — SODIUM CHLORIDE 9 MG/ML
INJECTION, SOLUTION INTRAVENOUS
Status: DISPENSED
Start: 2019-07-01

## (undated) RX ORDER — NEOSTIGMINE METHYLSULFATE 0.5 MG/ML
INJECTION INTRAVENOUS
Status: DISPENSED
Start: 2020-03-05

## (undated) RX ORDER — IBUPROFEN 200 MG
TABLET ORAL
Status: DISPENSED
Start: 2021-04-04